# Patient Record
Sex: MALE | Race: WHITE | NOT HISPANIC OR LATINO | Employment: OTHER | URBAN - METROPOLITAN AREA
[De-identification: names, ages, dates, MRNs, and addresses within clinical notes are randomized per-mention and may not be internally consistent; named-entity substitution may affect disease eponyms.]

---

## 2018-12-11 PROBLEM — Z12.11 COLON CANCER SCREENING: Status: ACTIVE | Noted: 2018-12-11

## 2019-10-03 ENCOUNTER — TELEPHONE (OUTPATIENT)
Dept: OTOLARYNGOLOGY | Facility: CLINIC | Age: 79
End: 2019-10-03

## 2019-11-20 ENCOUNTER — OFFICE VISIT (OUTPATIENT)
Dept: OTOLARYNGOLOGY | Facility: CLINIC | Age: 79
End: 2019-11-20
Payer: MEDICARE

## 2019-11-20 ENCOUNTER — CLINICAL SUPPORT (OUTPATIENT)
Dept: AUDIOLOGY | Facility: CLINIC | Age: 79
End: 2019-11-20
Payer: MEDICARE

## 2019-11-20 VITALS — WEIGHT: 166.25 LBS | BODY MASS INDEX: 24.62 KG/M2 | HEIGHT: 69 IN

## 2019-11-20 DIAGNOSIS — H90.3 SENSORINEURAL HEARING LOSS, BILATERAL: Primary | ICD-10-CM

## 2019-11-20 DIAGNOSIS — H91.90 HEARING LOSS, UNSPECIFIED HEARING LOSS TYPE, UNSPECIFIED LATERALITY: ICD-10-CM

## 2019-11-20 DIAGNOSIS — H80.23 COCHLEAR OTOSCLEROSIS OF BOTH EARS: Primary | ICD-10-CM

## 2019-11-20 PROCEDURE — 99999 PR PBB SHADOW E&M-EST. PATIENT-LVL III: ICD-10-PCS | Mod: PBBFAC,,, | Performed by: OTOLARYNGOLOGY

## 2019-11-20 PROCEDURE — 1159F PR MEDICATION LIST DOCUMENTED IN MEDICAL RECORD: ICD-10-PCS | Mod: S$GLB,,, | Performed by: OTOLARYNGOLOGY

## 2019-11-20 PROCEDURE — 1159F MED LIST DOCD IN RCRD: CPT | Mod: S$GLB,,, | Performed by: OTOLARYNGOLOGY

## 2019-11-20 PROCEDURE — 99999 PR PBB SHADOW E&M-EST. PATIENT-LVL III: CPT | Mod: PBBFAC,,, | Performed by: OTOLARYNGOLOGY

## 2019-11-20 PROCEDURE — 92557 COMPREHENSIVE HEARING TEST: CPT | Mod: S$GLB,,, | Performed by: AUDIOLOGIST

## 2019-11-20 PROCEDURE — 1101F PR PT FALLS ASSESS DOC 0-1 FALLS W/OUT INJ PAST YR: ICD-10-PCS | Mod: CPTII,S$GLB,, | Performed by: OTOLARYNGOLOGY

## 2019-11-20 PROCEDURE — 1126F AMNT PAIN NOTED NONE PRSNT: CPT | Mod: S$GLB,,, | Performed by: OTOLARYNGOLOGY

## 2019-11-20 PROCEDURE — 1101F PT FALLS ASSESS-DOCD LE1/YR: CPT | Mod: CPTII,S$GLB,, | Performed by: OTOLARYNGOLOGY

## 2019-11-20 PROCEDURE — 92557 PR COMPREHENSIVE HEARING TEST: ICD-10-PCS | Mod: S$GLB,,, | Performed by: AUDIOLOGIST

## 2019-11-20 PROCEDURE — 1126F PR PAIN SEVERITY QUANTIFIED, NO PAIN PRESENT: ICD-10-PCS | Mod: S$GLB,,, | Performed by: OTOLARYNGOLOGY

## 2019-11-20 PROCEDURE — 99204 PR OFFICE/OUTPT VISIT, NEW, LEVL IV, 45-59 MIN: ICD-10-PCS | Mod: S$GLB,,, | Performed by: OTOLARYNGOLOGY

## 2019-11-20 PROCEDURE — 99204 OFFICE O/P NEW MOD 45 MIN: CPT | Mod: S$GLB,,, | Performed by: OTOLARYNGOLOGY

## 2019-11-20 NOTE — PROGRESS NOTES
Florin Braden was seen today for a hearing evaluation.     Pure tone audiometry revealed a severe-profound hearing loss, AU.   Unmasked bone conduction scores obtained from 45-50dB; could not mask due to severity of hearing loss, AU  SRT and PTA are in good agreement for the left ear. Attempted for the right ear however due to severity of hearing loss patient was unable to repeat words.   Could not test for speech discrimination bilaterally due to severity of hearing loss, AU  Tympanometry: Could not seal, AU    Recommendations:  1. Otologic Evaluation  2. Annual Audiogram or repeat audiogram as needed  3. Hearing Protection  4. Cochlear Implant consult and evaluation.

## 2019-11-20 NOTE — PROGRESS NOTES
"Subjective:       Patient ID: Florin Braden is a 79 y.o. male.    Chief Complaint: Hearing Loss (discuss possible implant)    HPI   79 year old male presents to clinic as a new patient to discuss cochlear implants.  He relates a long history of hearing loss.  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  He also reports progressive hearing loss on the left, which is his better hearing ear.  He had a PE tube placed on the left side about 5 years ago for recurrent ear infections.  He currently wears hearing aids, but feels they are "maxed out"    Review of Systems   Constitutional: Negative.  Negative for activity change, appetite change, chills, diaphoresis and fatigue.   HENT: Negative for congestion, dental problem, facial swelling, sinus pressure, sinus pain and voice change.    Eyes: Negative.  Negative for pain and discharge.   Respiratory: Negative.  Negative for apnea and stridor.    Cardiovascular: Negative.  Negative for chest pain.   Gastrointestinal: Negative.  Negative for nausea and vomiting.   Endocrine: Negative.    Genitourinary: Negative.    Musculoskeletal: Negative for arthralgias, gait problem and neck stiffness.   Skin: Negative for rash and wound.   Allergic/Immunologic: Negative.    Neurological: Negative.  Negative for seizures and syncope.   Hematological: Negative.  Negative for adenopathy. Does not bruise/bleed easily.   Psychiatric/Behavioral: Negative for behavioral problems and confusion.         Objective:      Physical Exam   Constitutional: He appears well-developed and well-nourished. No distress.   HENT:   Head: Normocephalic and atraumatic.   Right Ear: Tympanic membrane, external ear and ear canal normal. No drainage, swelling or tenderness. No foreign bodies. No middle ear effusion. Decreased hearing is noted.   Left Ear: External ear normal. No drainage, " swelling or tenderness. A foreign body (PE tube in place ) is present.  No middle ear effusion. Decreased hearing is noted.   Ears:    Tuning fork exam with BC>AC bilaterally (cannot hear air conduction).  BC lateralizes weakly to the left.   Skin: He is not diaphoretic.           Assessment:       1. Cochlear otosclerosis of both ears    2. Hearing loss, unspecified hearing loss type, unspecified laterality        Plan:       Mr. Braden has a profound bilateral mixed hearing loss.  Likely due to advanced otosclerosis.  The left ear is the better hearing ear.  Even in an aided state, he has very poor word discrimination in the right ear.  Will proceed with immunization and CT temporal bone to evaluate right cochlea for implantation.

## 2019-12-16 ENCOUNTER — OFFICE VISIT (OUTPATIENT)
Dept: OTOLARYNGOLOGY | Facility: CLINIC | Age: 79
End: 2019-12-16
Payer: MEDICARE

## 2019-12-16 ENCOUNTER — CLINICAL SUPPORT (OUTPATIENT)
Dept: INFECTIOUS DISEASES | Facility: CLINIC | Age: 79
End: 2019-12-16
Payer: MEDICARE

## 2019-12-16 ENCOUNTER — CLINICAL SUPPORT (OUTPATIENT)
Dept: AUDIOLOGY | Facility: CLINIC | Age: 79
End: 2019-12-16
Payer: MEDICARE

## 2019-12-16 ENCOUNTER — HOSPITAL ENCOUNTER (OUTPATIENT)
Dept: RADIOLOGY | Facility: HOSPITAL | Age: 79
Discharge: HOME | End: 2019-12-16
Attending: OTOLARYNGOLOGY
Payer: MEDICARE

## 2019-12-16 VITALS — WEIGHT: 166.25 LBS | BODY MASS INDEX: 24.55 KG/M2

## 2019-12-16 DIAGNOSIS — H90.3 SENSORINEURAL HEARING LOSS, BILATERAL: Primary | ICD-10-CM

## 2019-12-16 DIAGNOSIS — H91.13 PRESBYCUSIS OF BOTH EARS: Primary | ICD-10-CM

## 2019-12-16 DIAGNOSIS — H91.90 HEARING LOSS, UNSPECIFIED HEARING LOSS TYPE, UNSPECIFIED LATERALITY: ICD-10-CM

## 2019-12-16 PROCEDURE — 90670 PNEUMOCOCCAL CONJUGATE VACCINE 13-VALENT LESS THAN 5YO & GREATER THAN: ICD-10-PCS | Mod: S$GLB,,, | Performed by: INTERNAL MEDICINE

## 2019-12-16 PROCEDURE — 1159F MED LIST DOCD IN RCRD: CPT | Mod: S$GLB,,, | Performed by: OTOLARYNGOLOGY

## 2019-12-16 PROCEDURE — 99999 PR PBB SHADOW E&M-EST. PATIENT-LVL II: ICD-10-PCS | Mod: PBBFAC,,, | Performed by: OTOLARYNGOLOGY

## 2019-12-16 PROCEDURE — 70480 CT ORBIT/EAR/FOSSA W/O DYE: CPT | Mod: 26,,, | Performed by: RADIOLOGY

## 2019-12-16 PROCEDURE — 99999 PR PBB SHADOW E&M-EST. PATIENT-LVL II: CPT | Mod: PBBFAC,,, | Performed by: OTOLARYNGOLOGY

## 2019-12-16 PROCEDURE — 99213 OFFICE O/P EST LOW 20 MIN: CPT | Mod: 25,S$GLB,, | Performed by: OTOLARYNGOLOGY

## 2019-12-16 PROCEDURE — 1101F PT FALLS ASSESS-DOCD LE1/YR: CPT | Mod: CPTII,S$GLB,, | Performed by: OTOLARYNGOLOGY

## 2019-12-16 PROCEDURE — 70480 CT TEMPORAL BONE WITHOUT CONTRAST: ICD-10-PCS | Mod: 26,,, | Performed by: RADIOLOGY

## 2019-12-16 PROCEDURE — G0009 PNEUMOCOCCAL CONJUGATE VACCINE 13-VALENT LESS THAN 5YO & GREATER THAN: ICD-10-PCS | Mod: S$GLB,,, | Performed by: INTERNAL MEDICINE

## 2019-12-16 PROCEDURE — 99213 PR OFFICE/OUTPT VISIT, EST, LEVL III, 20-29 MIN: ICD-10-PCS | Mod: 25,S$GLB,, | Performed by: OTOLARYNGOLOGY

## 2019-12-16 PROCEDURE — 1126F AMNT PAIN NOTED NONE PRSNT: CPT | Mod: S$GLB,,, | Performed by: OTOLARYNGOLOGY

## 2019-12-16 PROCEDURE — 70480 CT ORBIT/EAR/FOSSA W/O DYE: CPT | Mod: TC

## 2019-12-16 PROCEDURE — 1101F PR PT FALLS ASSESS DOC 0-1 FALLS W/OUT INJ PAST YR: ICD-10-PCS | Mod: CPTII,S$GLB,, | Performed by: OTOLARYNGOLOGY

## 2019-12-16 PROCEDURE — G0009 ADMIN PNEUMOCOCCAL VACCINE: HCPCS | Mod: S$GLB,,, | Performed by: INTERNAL MEDICINE

## 2019-12-16 PROCEDURE — 1126F PR PAIN SEVERITY QUANTIFIED, NO PAIN PRESENT: ICD-10-PCS | Mod: S$GLB,,, | Performed by: OTOLARYNGOLOGY

## 2019-12-16 PROCEDURE — 92626 EVAL AUD FUNCJ 1ST HOUR: CPT | Mod: S$GLB,,, | Performed by: AUDIOLOGIST

## 2019-12-16 PROCEDURE — 90670 PCV13 VACCINE IM: CPT | Mod: S$GLB,,, | Performed by: INTERNAL MEDICINE

## 2019-12-16 PROCEDURE — 92626 PR EVAL, AUDITORY FUNCTION, SURG IMPLANT DEVICE, 1ST HR: ICD-10-PCS | Mod: S$GLB,,, | Performed by: AUDIOLOGIST

## 2019-12-16 PROCEDURE — 1159F PR MEDICATION LIST DOCUMENTED IN MEDICAL RECORD: ICD-10-PCS | Mod: S$GLB,,, | Performed by: OTOLARYNGOLOGY

## 2019-12-16 NOTE — PROGRESS NOTES
COCHLEAR IMPLANT EVALUATION     Florin Braden was seen for cochlear implant evaluation as recommended by Jose A Abdullahi MD. Mr. Braden reported a history of progressive sensorineural hearing loss bilaterally. He has worn hearing aids for the past 20+ years.  There is little benefit from traditional amplification despite consistent hearing aid use. Mr. Braden reported he recently was seen by his audiologist at [facility] due to a complaint of distortion from the right hearing aid, however the hearing aid was in good working order, instead Mr. Braden was advised to be seen at [facility] for a cochlear implant evaluation. Florin Braden has significant difficulty with everyday conversation.     Pure tone threshold testing revealed a  severe-profound sensorineural hearing loss, bilaterally.  Speech reception thresholds could not be obtained for the right ear due to the severity of hearing loss; were obtained at 90dB for the left ear.  Speech discrimination scores could not be obtained for either ear due to the severity of hearing loss.   Aided testing was completed in the best aided condition with his hearing aid at user settings.  Aided responses were obtained at 40-85dBHL in sound field with both ears aided.  AzBIO sentences were presented at 60dBSPL in sound field with both ears aided in quiet.  AzBIO BOTH AIDED=22%,  RIGHT EAR AIDED ONLY=0%,  LEFT EAR AIDED ONLY=31%. Mr. Braden scored 0% on CNC word list for the right ear aided and 36% for the left ear aided at 50dBSPL.      Based on the results of this comprehensive auditory evaluation, Mr. Braden was considered a good candidate for cochlear implantation in the right ear  from an audiological standpoint pending medical clearance. Florin Braden has severe to profound sensorineural hearing loss bilaterally with limited benefit from amplification.  Limited benefit from amplification is defined by the test scores of ? 40% correct in the best-aided listening condition on tape  recorded tests of open-set sentence cognition.  The cochlear implant is the only accepted medical procedure for the treatment of his sensorineural hearing loss. Mr. Braden meets all current standards for cochlear implantation according to the Medicare and private insurance guidelines.  The cochlear implant is not provided primarily for the convenience of the patient, physician, or healthcare provider, but rather to improve hearing and communicative functioning.  The is the most appropriate device that can be safely provided to the patient.  It will be furnished by qualified personnel at [facility].  The benefits and limitations of cochlear implantation were discussed with Florin Braden including the range of possible outcomes.  The rehabilitation requirements and the immunization recommendations were reviewed today.     Recommend:  1.  Obtain required immunizations   2.  Cochlear implant for the RIGHT ear - [employer]LEAR [employer]

## 2019-12-16 NOTE — PROGRESS NOTES
Subjective:       Patient ID: Florin Braden is a 79 y.o. male.    Chief Complaint: Follow-up (CI evaluation/discuss CT)    HPI: Here for re  Check.    CT gisselle nl.      Past Medical History: Patient has a past medical history of CAD (coronary artery disease), Colon polyp, Diabetes mellitus, Diabetic neuropathy, Diabetic retinopathy, Elevated cholesterol, Gastric polyp, Gastroparesis, Hypertension, and Internal hemorrhoids.    Past Surgical History: Patient has a past surgical history that includes Coronary angioplasty with stent; Cholecystectomy; Hernia repair (Bilateral); Polypectomy; Knee arthroscopy (Left); Eye surgery (Bilateral); Coronary angioplasty with stent (04/01/2006); Coronary angioplasty with stent (05/01/2006); and Colonoscopy (N/A, 12/11/2018).    Social History: Patient reports that he has never smoked. He has never used smokeless tobacco. He reports that he does not drink alcohol or use drugs.    Family History: family history includes Cancer in his father; Diabetes in his mother; Heart disease in his father; Hypertension in his mother; Stroke in his mother.    Medications:   Current Outpatient Medications   Medication Sig    aspirin (ECOTRIN) 81 MG EC tablet Take 81 mg by mouth once daily.    clopidogrel (PLAVIX) 75 mg tablet Take 75 mg by mouth twice a week.    co-enzyme Q-10 30 mg capsule Take 30 mg by mouth 3 (three) times daily.    folic acid/multivit-min/lutein (CENTRUM SILVER ORAL) Take 1 tablet by mouth once daily.    liraglutide 0.6 mg/0.1 mL, 18 mg/3 mL, subq PNIJ (VICTOZA 2-MARCELO) 0.6 mg/0.1 mL (18 mg/3 mL) PnIj Inject 18 mg into the skin once daily.    metFORMIN (GLUCOPHAGE) 500 MG tablet Take 500 mg by mouth 2 (two) times daily with meals.    metoprolol succinate (TOPROL-XL) 25 MG 24 hr tablet Take 25 mg by mouth once daily.    niacin (NIASPAN) 750 mg TbSR Take 750 mg by mouth once daily.    vit A/vit C/vit E/zinc/copper (PRESERVISION AREDS ORAL) Take 1 tablet by mouth once daily.     vit B complex no.12/niacin,B3, (VITAMIN B COMPLEX NO.12-NIACIN ORAL) Take 1 tablet by mouth once daily.     No current facility-administered medications for this visit.        Allergies: Patient is allergic to pcn [penicillins].    Review of Systems   Constitutional: Negative for activity change, appetite change, chills, diaphoresis, fatigue, fever and unexpected weight change.   HENT: Positive for hearing loss. Negative for congestion, ear discharge, ear pain, facial swelling, nosebleeds, postnasal drip, rhinorrhea, sinus pressure, sneezing, sore throat, tinnitus, trouble swallowing and voice change.    Eyes: Negative for photophobia, pain, discharge, redness and visual disturbance.   Respiratory: Negative for cough, chest tightness, shortness of breath and wheezing.    Cardiovascular: Negative for chest pain and palpitations.   Gastrointestinal: Negative for abdominal pain, constipation, diarrhea and nausea.   Genitourinary: Negative for dysuria and frequency.   Musculoskeletal: Negative for arthralgias, back pain, gait problem, joint swelling, myalgias, neck pain and neck stiffness.   Skin: Negative for color change, pallor and rash.   Neurological: Negative for dizziness, tremors, seizures, syncope, facial asymmetry, speech difficulty, weakness, light-headedness, numbness and headaches.   Hematological: Negative for adenopathy. Does not bruise/bleed easily.   Psychiatric/Behavioral: Negative for agitation, confusion, decreased concentration, dysphoric mood and sleep disturbance. The patient is not nervous/anxious and is not hyperactive.        Objective:      Physical Exam   Constitutional: He appears well-developed and well-nourished. No distress.   HENT:   Head: Normocephalic and atraumatic.   Right Ear: No lacerations. No drainage, swelling or tenderness. No foreign bodies. No mastoid tenderness. Tympanic membrane is not injected, not scarred, not perforated, not erythematous, not retracted and not  bulging. Tympanic membrane mobility is normal. No middle ear effusion. No hemotympanum. Decreased hearing is noted.   Left Ear: No lacerations. No drainage, swelling or tenderness. No foreign bodies. No mastoid tenderness. Tympanic membrane is not injected, not scarred, not perforated, not erythematous, not retracted and not bulging. Tympanic membrane mobility is normal.  No middle ear effusion. No hemotympanum. Decreased hearing is noted.   Mouth/Throat: Oropharynx is clear and moist. No oropharyngeal exudate.   Eyes: Pupils are equal, round, and reactive to light. Right eye exhibits normal extraocular motion and no nystagmus. Left eye exhibits normal extraocular motion and no nystagmus.   Neck: Normal range of motion.   Neurological: He is alert. He has normal strength. He displays no atrophy and no tremor. No cranial nerve deficit or sensory deficit. He exhibits normal muscle tone. He displays a negative Romberg sign. He displays no seizure activity. Coordination and gait normal.   Skin: He is not diaphoretic.               Assessment:       1. Presbycusis of both ears        Plan:         Patient to have CI evaluation and consideration for right CI. Appropriate imaging, medical and financial clearances to be done. Risks, complications, alternatives and goals discussed and reviewed. Including: infection, bleeding, failure of device, extrusion, dizziness, facial nerve problems and other potential issues.    There is no middle ear infection, the cochlear lumen is suited to implantation and there are no lesions of the auditory nerves or brain.    There are no contraindications to surgery.

## 2020-02-20 ENCOUNTER — CLINICAL SUPPORT (OUTPATIENT)
Dept: INFECTIOUS DISEASES | Facility: CLINIC | Age: 80
End: 2020-02-20
Payer: MEDICARE

## 2020-02-20 PROCEDURE — G0009 PNEUMOCOCCAL POLYSACCHARIDE VACCINE 23-VALENT =>2YO SQ IM: ICD-10-PCS | Mod: S$GLB,,, | Performed by: INTERNAL MEDICINE

## 2020-02-20 PROCEDURE — G0009 ADMIN PNEUMOCOCCAL VACCINE: HCPCS | Mod: S$GLB,,, | Performed by: INTERNAL MEDICINE

## 2020-02-20 PROCEDURE — 90732 PPSV23 VACC 2 YRS+ SUBQ/IM: CPT | Mod: S$GLB,,, | Performed by: INTERNAL MEDICINE

## 2020-02-20 PROCEDURE — 90732 PNEUMOCOCCAL POLYSACCHARIDE VACCINE 23-VALENT =>2YO SQ IM: ICD-10-PCS | Mod: S$GLB,,, | Performed by: INTERNAL MEDICINE

## 2020-03-03 ENCOUNTER — TELEPHONE (OUTPATIENT)
Dept: OTOLARYNGOLOGY | Facility: CLINIC | Age: 80
End: 2020-03-03

## 2020-03-03 NOTE — TELEPHONE ENCOUNTER
----- Message from Isatu Brown sent at 3/3/2020 10:11 AM CST -----  Contact: marii clarke [facility] in [place]  Called in regards to patient's surgery clearance.     She can be reached at [phone]    Thanks  KB

## 2020-03-03 NOTE — TELEPHONE ENCOUNTER
Spoke with Sofiya at Dr. Herron Kumar office  to fax over medical clearance for patient's surgery scheduled 3/24/2020. CGC

## 2020-05-21 ENCOUNTER — TELEPHONE (OUTPATIENT)
Dept: OTOLARYNGOLOGY | Facility: CLINIC | Age: 80
End: 2020-05-21

## 2020-05-21 DIAGNOSIS — Z01.818 PRE-OP TESTING: ICD-10-CM

## 2020-05-21 DIAGNOSIS — H91.13 PRESBYCUSIS OF BOTH EARS: Primary | ICD-10-CM

## 2020-07-06 ENCOUNTER — ANESTHESIA EVENT (OUTPATIENT)
Dept: SURGERY | Facility: HOSPITAL | Age: 80
End: 2020-07-06
Payer: MEDICARE

## 2020-07-06 ENCOUNTER — TELEPHONE (OUTPATIENT)
Dept: OTOLARYNGOLOGY | Facility: CLINIC | Age: 80
End: 2020-07-06

## 2020-07-06 NOTE — ANESTHESIA PREPROCEDURE EVALUATION
[facility]  Anesthesia Pre-Operative Evaluation         Patient Name: Florin Braden  YOB: 1940  MRN: [id number]    SUBJECTIVE:     Pre-operative evaluation for Procedure(s) (LRB):  INSERTION, PROSTHESIS, COCHLEAR (Right)     07/06/2020    Florin Braden is a 79 y.o. male w/ a significant PMHx of CAD s/p PCI [>10 years ago] on aspirin and plavix, HTN, HLD, DM and bilateral sensorineural hearing loss (right > left).  Patient now presents for the above procedure(s).    Prev airway: None documented.  Anes Hx: None documented. Tolerated sedation for colonoscopy    EKG 03/2020: Sinus rhythm    Patient Active Problem List   Diagnosis    Colon cancer screening       Review of patient's allergies indicates:   Allergen Reactions    Pcn [penicillins]        Current Inpatient Medications:      No current facility-administered medications on file prior to encounter.      Current Outpatient Medications on File Prior to Encounter   Medication Sig Dispense Refill    aspirin (ECOTRIN) 81 MG EC tablet Take 81 mg by mouth once daily.      clopidogrel (PLAVIX) 75 mg tablet Take 75 mg by mouth twice a week.      co-enzyme Q-10 30 mg capsule Take 30 mg by mouth 3 (three) times daily.      folic acid/multivit-min/lutein (CENTRUM SILVER ORAL) Take 1 tablet by mouth once daily.      liraglutide 0.6 mg/0.1 mL, 18 mg/3 mL, subq PNIJ (VICTOZA 2-MARCELO) 0.6 mg/0.1 mL (18 mg/3 mL) PnIj Inject 18 mg into the skin once daily.      metFORMIN (GLUCOPHAGE) 500 MG tablet Take 500 mg by mouth 2 (two) times daily with meals.      metoprolol succinate (TOPROL-XL) 25 MG 24 hr tablet Take 25 mg by mouth once daily.      niacin (NIASPAN) 750 mg TbSR Take 750 mg by mouth once daily.      vit A/vit C/vit E/zinc/copper (PRESERVISION AREDS ORAL) Take 1 tablet by mouth once daily.      vit B complex no.12/niacin,B3, (VITAMIN B COMPLEX NO.12-NIACIN ORAL) Take 1 tablet by mouth once daily.         Past Surgical History:    Procedure Laterality Date    CHOLECYSTECTOMY      COLONOSCOPY N/A 12/11/2018    Procedure: SCREENING COLONOSCOPY;  Surgeon: Axel Swartz MD;  Location: [facility];  Service: Endoscopy;  Laterality: N/A;    CORONARY ANGIOPLASTY WITH STENT PLACEMENT      CORONARY ANGIOPLASTY WITH STENT PLACEMENT  04/01/2006    CORONARY ANGIOPLASTY WITH STENT PLACEMENT  05/01/2006    EYE SURGERY Bilateral     RK, Cataract    HERNIA REPAIR Bilateral     inguinal    KNEE ARTHROSCOPY Left     POLYPECTOMY      gastric and colon       Social History     Socioeconomic History    Marital status:      Spouse name: Not on file    Number of children: Not on file    Years of education: Not on file    Highest education level: Not on file   Occupational History    Not on file   Social Needs    Financial resource strain: Not on file    Food insecurity     Worry: Not on file     Inability: Not on file    Transportation needs     Medical: Not on file     Non-medical: Not on file   Tobacco Use    Smoking status: Never Smoker    Smokeless tobacco: Never Used   Substance and Sexual Activity    Alcohol use: No     Frequency: Never    Drug use: No    Sexual activity: Not on file   Lifestyle    Physical activity     Days per week: Not on file     Minutes per session: Not on file    Stress: Not on file   Relationships    Social connections     Talks on phone: Not on file     Gets together: Not on file     Attends Sikhism service: Not on file     Active member of club or organization: Not on file     Attends meetings of clubs or organizations: Not on file     Relationship status: Not on file   Other Topics Concern    Not on file   Social History Narrative    Not on file       OBJECTIVE:     Vital Signs Range (Last 24H):         Significant Labs:  No results found for: WBC, HGB, HCT, PLT, CHOL, TRIG, HDL, LDLDIRECT, ALT, AST, NA, K, CL, CREATININE, BUN, CO2, TSH, PSA, INR, GLUF, HGBA1C, MICROALBUR    Diagnostic  Studies: No relevant studies.    EKG:   Results for orders placed or performed during the hospital encounter of 03/02/20   EKG 12-lead    Collection Time: 03/02/20  1:16 PM    Narrative    Test Reason : Z01.818,I25.9,    Vent. Rate : 062 BPM     Atrial Rate : 062 BPM     P-R Int : 184 ms          QRS Dur : 092 ms      QT Int : 368 ms       P-R-T Axes : 028 066 040 degrees     QTc Int : 373 ms    Sinus rhythm with marked sinus arrythmia  Otherwise normal ECG  No previous ECGs available  Confirmed by Nikolas Cook MD ([id number]) on 3/2/2020 7:07:29 PM    Referred By: EBONY PABON           Confirmed By:Nikolas Cook MD       2D ECHO:  TTE:  No results found for this or any previous visit.    ASSESSMENT/PLAN:       Anesthesia Evaluation    I have reviewed the Patient Summary Reports.    I have reviewed the Nursing Notes.    I have reviewed the Medications.     Review of Systems  Anesthesia Hx:  No problems with previous Anesthesia Denies Hx of Anesthetic complications  Denies Family Hx of Anesthesia complications.   Denies Personal Hx of Anesthesia complications.   Social:  Non-Smoker, No Alcohol Use    Cardiovascular:   Hypertension CAD   hyperlipidemia  Coronary Artery Disease:  Hypertension    Pulmonary:   Denies Asthma.  Denies Shortness of breath.    Renal/:   Denies Chronic Renal Disease.     Hepatic/GI:   Denies GERD.    Endocrine:   Diabetes  Diabetes        Physical Exam  General:  Well nourished    Airway/Jaw/Neck:  Airway Findings: Mouth Opening: Normal Tongue: Normal  General Airway Assessment: Adult  Mallampati: II      Dental:  Dental Findings: (missing bottom teeth)   Chest/Lungs:  Chest/Lungs Findings: Normal Respiratory Rate, Clear to auscultation     Heart/Vascular:  Heart Findings: Rate: Normal  Rhythm: Regular Rhythm  Sounds: Normal      Musculoskeletal:  Musculoskeletal Findings:          Anesthesia Plan  Type of Anesthesia, risks & benefits discussed:  Anesthesia Type:  general  Patient's  Preference:   Intra-op Monitoring Plan: standard ASA monitors  Intra-op Monitoring Plan Comments:   Post Op Pain Control Plan: multimodal analgesia, IV/PO Opioids PRN and per primary service following discharge from PACU  Post Op Pain Control Plan Comments:   Induction:   IV  Beta Blocker:  Patient is not currently on a Beta-Blocker (No further documentation required).       Informed Consent: Patient understands risks and agrees with Anesthesia plan.  Questions answered. Anesthesia consent signed with patient.  ASA Score: 2     Day of Surgery Review of History & Physical:    H&P update referred to the surgeon.     Anesthesia Plan Notes: Pt states last aspirin use 7 days ago, last plavix use 10 days ago. He does not take the metoprolol succinate listed in his medications.         Ready For Surgery From Anesthesia Perspective.

## 2020-07-07 ENCOUNTER — ANESTHESIA (OUTPATIENT)
Dept: SURGERY | Facility: HOSPITAL | Age: 80
End: 2020-07-07
Payer: MEDICARE

## 2020-07-07 ENCOUNTER — HOSPITAL ENCOUNTER (OUTPATIENT)
Facility: HOSPITAL | Age: 80
Discharge: HOME | End: 2020-07-07
Attending: OTOLARYNGOLOGY | Admitting: OTOLARYNGOLOGY
Payer: MEDICARE

## 2020-07-07 VITALS
HEIGHT: 68 IN | BODY MASS INDEX: 26.83 KG/M2 | WEIGHT: 177 LBS | OXYGEN SATURATION: 96 % | RESPIRATION RATE: 13 BRPM | DIASTOLIC BLOOD PRESSURE: 60 MMHG | TEMPERATURE: 98 F | HEART RATE: 79 BPM | SYSTOLIC BLOOD PRESSURE: 127 MMHG

## 2020-07-07 VITALS — HEART RATE: 88 BPM | DIASTOLIC BLOOD PRESSURE: 59 MMHG | OXYGEN SATURATION: 100 % | SYSTOLIC BLOOD PRESSURE: 125 MMHG

## 2020-07-07 DIAGNOSIS — H90.3 SENSORINEURAL HEARING LOSS (SNHL) OF BOTH EARS: ICD-10-CM

## 2020-07-07 DIAGNOSIS — H90.5 SENSORINEURAL HEARING LOSS: Primary | ICD-10-CM

## 2020-07-07 LAB
POCT GLUCOSE: 103 MG/DL (ref 70–110)
POCT GLUCOSE: 190 MG/DL (ref 70–110)
SARS-COV-2 RDRP RESP QL NAA+PROBE: NEGATIVE

## 2020-07-07 PROCEDURE — 82962 GLUCOSE BLOOD TEST: CPT | Performed by: OTOLARYNGOLOGY

## 2020-07-07 PROCEDURE — 94761 N-INVAS EAR/PLS OXIMETRY MLT: CPT

## 2020-07-07 PROCEDURE — U0002 COVID-19 LAB TEST NON-CDC: HCPCS

## 2020-07-07 PROCEDURE — 25000003 PHARM REV CODE 250: Performed by: STUDENT IN AN ORGANIZED HEALTH CARE EDUCATION/TRAINING PROGRAM

## 2020-07-07 PROCEDURE — 69930 IMPLANT COCHLEAR DEVICE: CPT | Mod: RT,,, | Performed by: OTOLARYNGOLOGY

## 2020-07-07 PROCEDURE — 36000711: Performed by: OTOLARYNGOLOGY

## 2020-07-07 PROCEDURE — 71000015 HC POSTOP RECOV 1ST HR: Performed by: OTOLARYNGOLOGY

## 2020-07-07 PROCEDURE — 37000009 HC ANESTHESIA EA ADD 15 MINS: Performed by: OTOLARYNGOLOGY

## 2020-07-07 PROCEDURE — 37000008 HC ANESTHESIA 1ST 15 MINUTES: Performed by: OTOLARYNGOLOGY

## 2020-07-07 PROCEDURE — 36000710: Performed by: OTOLARYNGOLOGY

## 2020-07-07 PROCEDURE — 69930 PR IMPLANT COCHLEAR DEVICE: ICD-10-PCS | Mod: RT,,, | Performed by: OTOLARYNGOLOGY

## 2020-07-07 PROCEDURE — 71000033 HC RECOVERY, INTIAL HOUR: Performed by: OTOLARYNGOLOGY

## 2020-07-07 PROCEDURE — D9220A PRA ANESTHESIA: Mod: ,,, | Performed by: ANESTHESIOLOGY

## 2020-07-07 PROCEDURE — 27201423 OPTIME MED/SURG SUP & DEVICES STERILE SUPPLY: Performed by: OTOLARYNGOLOGY

## 2020-07-07 PROCEDURE — D9220A PRA ANESTHESIA: ICD-10-PCS | Mod: ,,, | Performed by: ANESTHESIOLOGY

## 2020-07-07 PROCEDURE — 63600175 PHARM REV CODE 636 W HCPCS: Performed by: STUDENT IN AN ORGANIZED HEALTH CARE EDUCATION/TRAINING PROGRAM

## 2020-07-07 PROCEDURE — 25000003 PHARM REV CODE 250: Performed by: OTOLARYNGOLOGY

## 2020-07-07 PROCEDURE — 71000044 HC DOSC ROUTINE RECOVERY FIRST HOUR: Performed by: OTOLARYNGOLOGY

## 2020-07-07 PROCEDURE — L8614 COCHLEAR DEVICE: HCPCS | Performed by: OTOLARYNGOLOGY

## 2020-07-07 DEVICE — MODEL IMPLANT CI612: Type: IMPLANTABLE DEVICE | Site: EAR | Status: FUNCTIONAL

## 2020-07-07 RX ORDER — PROPOFOL 10 MG/ML
VIAL (ML) INTRAVENOUS
Status: DISCONTINUED | OUTPATIENT
Start: 2020-07-07 | End: 2020-07-07

## 2020-07-07 RX ORDER — SUCCINYLCHOLINE CHLORIDE 20 MG/ML
INJECTION INTRAMUSCULAR; INTRAVENOUS
Status: DISCONTINUED | OUTPATIENT
Start: 2020-07-07 | End: 2020-07-07

## 2020-07-07 RX ORDER — ONDANSETRON 4 MG/1
4 TABLET, ORALLY DISINTEGRATING ORAL EVERY 6 HOURS PRN
Qty: 12 TABLET | Refills: 0 | Status: SHIPPED | OUTPATIENT
Start: 2020-07-07

## 2020-07-07 RX ORDER — FENTANYL CITRATE 50 UG/ML
INJECTION, SOLUTION INTRAMUSCULAR; INTRAVENOUS
Status: DISCONTINUED | OUTPATIENT
Start: 2020-07-07 | End: 2020-07-07

## 2020-07-07 RX ORDER — LIDOCAINE HYDROCHLORIDE 40 MG/ML
SOLUTION TOPICAL
Status: DISCONTINUED | OUTPATIENT
Start: 2020-07-07 | End: 2020-07-07

## 2020-07-07 RX ORDER — DEXAMETHASONE SODIUM PHOSPHATE 4 MG/ML
INJECTION, SOLUTION INTRA-ARTICULAR; INTRALESIONAL; INTRAMUSCULAR; INTRAVENOUS; SOFT TISSUE
Status: DISCONTINUED | OUTPATIENT
Start: 2020-07-07 | End: 2020-07-07

## 2020-07-07 RX ORDER — HYDROCODONE BITARTRATE AND ACETAMINOPHEN 5; 325 MG/1; MG/1
1 TABLET ORAL EVERY 6 HOURS PRN
Qty: 12 TABLET | Refills: 0 | Status: SHIPPED | OUTPATIENT
Start: 2020-07-07

## 2020-07-07 RX ORDER — SODIUM CHLORIDE 0.9 % (FLUSH) 0.9 %
10 SYRINGE (ML) INJECTION
Status: DISCONTINUED | OUTPATIENT
Start: 2020-07-07 | End: 2020-07-14 | Stop reason: HOSPADM

## 2020-07-07 RX ORDER — ONDANSETRON 2 MG/ML
4 INJECTION INTRAMUSCULAR; INTRAVENOUS DAILY PRN
Status: DISCONTINUED | OUTPATIENT
Start: 2020-07-07 | End: 2020-07-14 | Stop reason: HOSPADM

## 2020-07-07 RX ORDER — LIDOCAINE HCL/PF 100 MG/5ML
SYRINGE (ML) INTRAVENOUS
Status: DISCONTINUED | OUTPATIENT
Start: 2020-07-07 | End: 2020-07-07

## 2020-07-07 RX ORDER — MECLIZINE HCL 12.5 MG 12.5 MG/1
12.5 TABLET ORAL 3 TIMES DAILY PRN
Qty: 12 TABLET | Refills: 0 | Status: SHIPPED | OUTPATIENT
Start: 2020-07-07

## 2020-07-07 RX ORDER — CLINDAMYCIN HYDROCHLORIDE 300 MG/1
300 CAPSULE ORAL 3 TIMES DAILY
Qty: 21 CAPSULE | Refills: 0 | Status: SHIPPED | OUTPATIENT
Start: 2020-07-07 | End: 2020-07-14

## 2020-07-07 RX ORDER — CLINDAMYCIN PHOSPHATE 900 MG/50ML
900 INJECTION, SOLUTION INTRAVENOUS
Status: COMPLETED | OUTPATIENT
Start: 2020-07-07 | End: 2020-07-07

## 2020-07-07 RX ORDER — LIDOCAINE HYDROCHLORIDE AND EPINEPHRINE 10; 10 MG/ML; UG/ML
INJECTION, SOLUTION INFILTRATION; PERINEURAL
Status: DISCONTINUED | OUTPATIENT
Start: 2020-07-07 | End: 2020-07-07 | Stop reason: HOSPADM

## 2020-07-07 RX ORDER — ACETAMINOPHEN 10 MG/ML
INJECTION, SOLUTION INTRAVENOUS
Status: DISCONTINUED | OUTPATIENT
Start: 2020-07-07 | End: 2020-07-07

## 2020-07-07 RX ORDER — ROCURONIUM BROMIDE 10 MG/ML
INJECTION, SOLUTION INTRAVENOUS
Status: DISCONTINUED | OUTPATIENT
Start: 2020-07-07 | End: 2020-07-07

## 2020-07-07 RX ORDER — FENTANYL CITRATE 50 UG/ML
25 INJECTION, SOLUTION INTRAMUSCULAR; INTRAVENOUS EVERY 5 MIN PRN
Status: DISCONTINUED | OUTPATIENT
Start: 2020-07-07 | End: 2020-07-14 | Stop reason: HOSPADM

## 2020-07-07 RX ORDER — EPHEDRINE SULFATE 50 MG/ML
INJECTION, SOLUTION INTRAVENOUS
Status: DISCONTINUED | OUTPATIENT
Start: 2020-07-07 | End: 2020-07-07

## 2020-07-07 RX ORDER — LIDOCAINE HYDROCHLORIDE 10 MG/ML
1 INJECTION, SOLUTION EPIDURAL; INFILTRATION; INTRACAUDAL; PERINEURAL ONCE
Status: DISCONTINUED | OUTPATIENT
Start: 2020-07-07 | End: 2020-07-14 | Stop reason: HOSPADM

## 2020-07-07 RX ORDER — MIDAZOLAM HYDROCHLORIDE 1 MG/ML
INJECTION, SOLUTION INTRAMUSCULAR; INTRAVENOUS
Status: DISCONTINUED | OUTPATIENT
Start: 2020-07-07 | End: 2020-07-07

## 2020-07-07 RX ORDER — PHENYLEPHRINE HYDROCHLORIDE 10 MG/ML
INJECTION INTRAVENOUS
Status: DISCONTINUED | OUTPATIENT
Start: 2020-07-07 | End: 2020-07-07

## 2020-07-07 RX ORDER — SODIUM CHLORIDE 9 MG/ML
INJECTION, SOLUTION INTRAVENOUS CONTINUOUS PRN
Status: DISCONTINUED | OUTPATIENT
Start: 2020-07-07 | End: 2020-07-07

## 2020-07-07 RX ORDER — METHYLENE BLUE 10 MG/ML
INJECTION INTRAVENOUS
Status: DISCONTINUED | OUTPATIENT
Start: 2020-07-07 | End: 2020-07-07 | Stop reason: HOSPADM

## 2020-07-07 RX ADMIN — DEXAMETHASONE SODIUM PHOSPHATE 8 MG: 4 INJECTION, SOLUTION INTRAMUSCULAR; INTRAVENOUS at 08:07

## 2020-07-07 RX ADMIN — SUGAMMADEX 200 MG: 100 INJECTION, SOLUTION INTRAVENOUS at 08:07

## 2020-07-07 RX ADMIN — LIDOCAINE HYDROCHLORIDE 4 ML: 40 SOLUTION TOPICAL at 07:07

## 2020-07-07 RX ADMIN — ACETAMINOPHEN 1000 MG: 10 INJECTION, SOLUTION INTRAVENOUS at 08:07

## 2020-07-07 RX ADMIN — LIDOCAINE HYDROCHLORIDE 40 MG: 20 INJECTION, SOLUTION INTRAVENOUS at 07:07

## 2020-07-07 RX ADMIN — MIDAZOLAM HYDROCHLORIDE 0.5 MG: 1 INJECTION, SOLUTION INTRAMUSCULAR; INTRAVENOUS at 07:07

## 2020-07-07 RX ADMIN — CLINDAMYCIN PHOSPHATE 900 MG: 18 INJECTION, SOLUTION INTRAVENOUS at 07:07

## 2020-07-07 RX ADMIN — SODIUM CHLORIDE, SODIUM GLUCONATE, SODIUM ACETATE, POTASSIUM CHLORIDE, MAGNESIUM CHLORIDE, SODIUM PHOSPHATE, DIBASIC, AND POTASSIUM PHOSPHATE: .53; .5; .37; .037; .03; .012; .00082 INJECTION, SOLUTION INTRAVENOUS at 08:07

## 2020-07-07 RX ADMIN — PROPOFOL 150 MG: 10 INJECTION, EMULSION INTRAVENOUS at 07:07

## 2020-07-07 RX ADMIN — SUCCINYLCHOLINE CHLORIDE 120 MG: 20 INJECTION, SOLUTION INTRAMUSCULAR; INTRAVENOUS at 07:07

## 2020-07-07 RX ADMIN — SODIUM CHLORIDE: 0.9 INJECTION, SOLUTION INTRAVENOUS at 06:07

## 2020-07-07 RX ADMIN — EPHEDRINE SULFATE 10 MG: 50 INJECTION INTRAVENOUS at 08:07

## 2020-07-07 RX ADMIN — FENTANYL CITRATE 50 MCG: 50 INJECTION, SOLUTION INTRAMUSCULAR; INTRAVENOUS at 07:07

## 2020-07-07 RX ADMIN — PHENYLEPHRINE HYDROCHLORIDE 100 MCG: 10 INJECTION INTRAVENOUS at 08:07

## 2020-07-07 RX ADMIN — ROCURONIUM BROMIDE 20 MG: 10 INJECTION, SOLUTION INTRAVENOUS at 08:07

## 2020-07-07 RX ADMIN — FENTANYL CITRATE 50 MCG: 50 INJECTION, SOLUTION INTRAMUSCULAR; INTRAVENOUS at 08:07

## 2020-07-07 RX ADMIN — ONDANSETRON 4 MG: 2 INJECTION, SOLUTION INTRAMUSCULAR; INTRAVENOUS at 10:07

## 2020-07-07 RX ADMIN — ROCURONIUM BROMIDE 10 MG: 10 INJECTION, SOLUTION INTRAVENOUS at 07:07

## 2020-07-07 NOTE — OP NOTE
Pre Op Dx:Sensorineural Hearing Loss/ Rock    Post Op Dx: Same.    Operative Procedure: Nucleus 24 Channel 612 Cochlear Implantation with use of Operating Microscope and Facial Nerve Monitor. Right    Surgeon: Jose A Abdullahi M.D.    Assist:Viki  7/7/20    Estimated Blood Loss: 5 cc    Anesthesia: GET.    Operative Procedure in Detail:    The patient was brought to the operating room and placed in the supine position. After general endotracheal anesthesia was induced the patient was prepped and draped in there usual fashion for post auricular cochlear  implant surgery. Using the implant guides the front corner of the implant bed was marked on the lateral musculoperiosteum with an injection of methylene blue.  A C shaped incision was outlined in the postauricular area and infiltrated with 1% xylocaine with 1/100,000 epinephrine solution. The incision was made and carried down to the level of the temporalis muscle.  Hemostasis was obtained. A posteriorly directed subcutaneous flap was created to the level of the methelene blue kay. Retractors were placed. An anterior based musculoperiosteal flap was incised and elevated to the level of the ear canal. Emissary veins were managed with bone wax and electrocautery. A posterior, superior subperiosteal flap was then elevated for the implant. Using the implant guides a trough for the implant electrodes were created in the lateral temporal bone at the site of the methylene blue kay to a depth of 5 mm. A posterior, superior ramp was then drilled for the implant antennae. A complete simple mastoidectomy was next done and the facial recess opened using a 1.5 mm osamni tommy. The round window niche was identified.  Musculoperiosteum was harvested for later cochlear packing. Final hemostasis and irrigation was then done. A trough was created underneath the temporalis muscle for the ground electrode. Using micro suction, a 1 mm tommy and soft surgical techniques a cochleostomy  was created into the basal turn of the cochlea at the anterior inferior aspect of the round window niche. The implant was then brought on to the field and placed into the subperiosteal pocket. The active electrode was introduced into the cochleostomy and advanced off stylet until a full insertion had been obtained. The previously harvested fibrous tissue was then used to pack the cochleostomy to affect a seal. The ground electrode was placed in the superior trough and under the temporalis muscle.  The electrodes were then coiled into the mastoid defect. Final hemostasis was obtained with bipolar electrocautery only. Irrigation was done and the wound was closed in layers. A sterile pressure dressing was applied. The patient tolerated the procedure well. There were no intraoperative complications.

## 2020-07-07 NOTE — DISCHARGE INSTRUCTIONS
Cochlear Implant Surgery    A cochlear implant is a device that helps reverse nerve-related hearing loss. It can treat hearing loss that will not respond to hearing aids. During cochlear implant surgery, the device is implanted into the inner ear (cochlea). A few weeks after surgery, the device is activated and hearing is restored. Typically, only 1 implant is placed. But, if needed, an implant can be placed in both ears. You and your healthcare provider will discuss whats best for you.  Preparing for surgery  Prepare for the procedure as you have been instructed. In addition:  · Be sure to tell your healthcare provider about all medicines you take. This includes over-the-counter medicines. It also includes herbs and other supplements. You may need to stop taking some or all of them before surgery as directed by your healthcare provider.  · Follow any directions you are given for not eating or drinking before surgery.  The day of surgery  The surgery takes 2 to 3 hours. Before the surgery begins:  · An IV line is put into a vein in your arm or hand. This line delivers fluids and medicines.  · You will be given medicine (anesthesia) to keep you free of pain during the surgery. You will have general anesthesia. This puts you into a state like deep sleep during the surgery.  · The area around the implant site will be shaved.  During the surgery  · The surgeon makes an incision behind the ear. The mastoid bone is exposed. This is the bone you can feel behind the ear.  · The mastoid bone is opened with a surgical drill. Great care is taken to avoid harming the facial nerve, which runs through the bone. This nerve controls your facial muscles. A facial nerve monitor (a machine with a small sensor that is put onto your cheek) may be used to map the nerves exact location. This helps avoid damage.  · The cochlear implant is placed inside the hole in the mastoid bone.  · A group of electrodes called the electrode array is  attached. This is placed into the inner ear.  · More bone is removed from behind the ear. The /stimulator is then placed in this hole. This allows the unit to lie flat under the skin.  · The skin incision behind the ear is closed with sutures.  · If an implant is being placed in the other ear, this may be done at this time.  After the surgery  You will be taken to a recovery room to wake up from the anesthesia. You may be sleepy and nauseated at first. And you may feel dizzy. You will be given medicine to manage any pain. You may then be taken to a hospital room to stay overnight. Once you are ready to go home, you will be released to an adult family member or friend. Have someone stay with you for the next couple of days to help care for you as your healing begins.  Recovering at home  Recovery time varies for each person. Your healthcare provider will tell you when you can return to your normal routine. Once at home, follow the instructions you have been given. While you recover:  · Take prescribed pain medicine exactly as directed. Take it on time. Do not wait for the pain to get bad before you take it.  · For 3 to 5 days after surgery, sleep with your head raised above the level of your heart. This helps reduce swelling.  · Do not drive until your healthcare provider says its OK.  · Care for incisions as instructed by your healthcare provider.  When to call your healthcare provider  Be sure you have a contact number for your healthcare provider. After you get home, call if you have any of the following:  · Chest pain or trouble breathing (call 911 or other emergency service)  · Fever of 100.4°F (38°C) or higher, or as directed by your healthcare provider  · Pain that does not get better with medicine  · Symptoms of infection at an incision site, like increased redness or swelling, warmth, worsening pain, or foul-smelling drainage  · Signs of meningitis, like increasing neck stiffness, sensitivity to  light, dizziness that gets worse, or facial weakness (note: meningitis could happen months after surgery)   Follow-up  During follow-up visits, your healthcare provider will check your healing. Stitches or staples will be removed 7 to 10 days after the surgery. When the incision has healed, the outer part of the implant is attached behind your ear. This will allow the device to work. Continue to follow up with your healthcare provider as directed. Speech and hearing specialists will help you adjust to your implant.  Risks and possible complications  Risks of cochlear implant surgery include:  · Bleeding  · Infection  · Temporary dizziness  · Severe vertigo (dizziness) that lasts up to 6 weeks  · Tinnitus (ringing or buzzing in your ears)  · Device eroding through the skin  · Facial nerve paralysis  · Damage to nerves and blood vessels at or near the incision site  · Leakage of brain fluid  · Device failure  · Risks of anesthesia    Date Last Reviewed: 6/11/2015  © 0377-0945 [employer]. [address] [place] [address]. All rights reserved. This information is not intended as a substitute for professional medical care. Always follow your healthcare professional's instructions.        Anesthesia: General Anesthesia     You are watched continuously during your procedure by your anesthesia provider.     Youre due to have surgery. During surgery, youll be given medicine called anesthesia or anesthetic. This will keep you comfortable and pain-free. Your anesthesia provider will use general anesthesia.  What is general anesthesia?  General anesthesia puts you into a state like deep sleep. It goes into the bloodstream (IV anesthetics), into the lungs (gas anesthetics), or both. You feel nothing during the procedure. You will not remember it. During the procedure, the anesthesia provider monitors you continuously. He or she checks your heart rate and rhythm, blood pressure, breathing, and blood  oxygen.  · IV anesthetics. IV anesthetics are given through an IV line in your arm. Theyre often given first. This is so you are asleep before a gas anesthetic is started. Some kinds of IV anesthetics relieve pain. Others relax you. Your doctor will decide which kind is best in your case.  · Gas anesthetics. Gas anesthetics are breathed into the lungs. They are often used to keep you asleep. They can be given through a facemask or a tube placed in your larynx or trachea (breathing tube).  ¨ If you have a facemask, your anesthesia provider will most likely place it over your nose and mouth while youre still awake. Youll breathe oxygen through the mask as your IV anesthetic is started. Gas anesthetic may be added through the mask.  ¨ If you have a tube in the larynx or trachea, it will be inserted into your throat after youre asleep.  Anesthesia tools and medicines  You will likely have:  · IV anesthetics. These are put into an IV line into your bloodstream.  · Gas anesthetics. You breathe these anesthetics into your lungs, where they pass into your bloodstream.  · Pulse oximeter. This is a small clip that is attached to the end of your finger. This measures your blood oxygen level.  · Electrocardiography leads (electrodes). These are small sticky pads that are placed on your chest. They record your heart rate and rhythm.  · Blood pressure cuff. This reads your blood pressure.  Risks and possible complications  General anesthesia has some risks. These include:  · Breathing problems  · Nausea and vomiting  · Sore throat or hoarseness (usually temporary)  · Allergic reaction to the anesthetic  · Irregular heartbeat (rare)  · Cardiac arrest (rare)   Anesthesia safety  · Follow all instructions you are given for how long not to eat or drink before your procedure.  · Be sure your doctor knows what medicines and drugs you take. This includes over-the-counter medicines, herbs, supplements, alcohol or other drugs. You  will be asked when those were last taken.  · Have an adult family member or friend drive you home after the procedure.  · For the first 24 hours after your surgery:  ¨ Do not drive or use heavy equipment.  ¨ Do not make important decisions or sign legal documents. If important decisions or signing legal documents is necessary during the first 24 hours after surgery, have a trusted family member or spouse act on your behalf.  ¨ Avoid alcohol.  ¨ Have a responsible adult stay with you. He or she can watch for problems and help keep you safe.  Date Last Reviewed: 12/1/2016  © 3528-1915 [employer]. [address] [place] [address], [place] [address]. All rights reserved. This information is not intended as a substitute for professional medical care. Always follow your healthcare professional's instructions.

## 2020-07-07 NOTE — ANESTHESIA PROCEDURE NOTES
Intubation  Performed by: Jose Alejandro Campbell MD  Authorized by: Ramakrishna Christensen MD     Intubation:     Induction:  Intravenous    Intubated:  Postinduction    Mask Ventilation:  Easy mask    Attempts:  1    Attempted By:  Resident anesthesiologist    Method of Intubation:  Direct    Blade:  Tosin 3    Laryngeal View Grade: Grade IIA - cords partially seen      Difficult Airway Encountered?: No      Complications:  None    Airway Device:  Oral endotracheal tube    Airway Device Size:  7.5    Style/Cuff Inflation:  Cuffed    Inflation Amount (mL):  6    Tube secured:  23    Secured at:  The lips    Placement Verified By:  Capnometry    Findings Post-Intubation:  BS equal bilateral  Notes:      LTA

## 2020-07-07 NOTE — ANESTHESIA POSTPROCEDURE EVALUATION
Anesthesia Post Evaluation    Patient: Florin Braden    Procedure(s) Performed: Procedure(s) (LRB):  INSERTION, PROSTHESIS, COCHLEAR (Right)    Final Anesthesia Type: general    Patient location during evaluation: PACU  Patient participation: Yes- Able to Participate  Level of consciousness: awake and alert and oriented  Post-procedure vital signs: reviewed and stable  Pain management: adequate  Airway patency: patent    PONV status at discharge: No PONV  Anesthetic complications: no      Cardiovascular status: hemodynamically stable  Respiratory status: unassisted, spontaneous ventilation and room air  Hydration status: euvolemic  Follow-up not needed.          Vitals Value Taken Time   /60 07/07/20 1130   Temp 36.8 °C (98.2 °F) 07/07/20 1130   Pulse 81 07/07/20 1139   Resp 11 07/07/20 1139   SpO2 97 % 07/07/20 1139   Vitals shown include unvalidated device data.      No case tracking events are documented in the log.      Pain/Milton Score: Milton Score: 10 (7/7/2020 11:00 AM)        
no

## 2020-07-07 NOTE — TRANSFER OF CARE
"Anesthesia Transfer of Care Note    Patient: Florin Braden    Procedure(s) Performed: Procedure(s) (LRB):  INSERTION, PROSTHESIS, COCHLEAR (Right)    Patient location: PACU    Anesthesia Type: general    Transport from OR: Transported from OR on 6-10 L/min O2 by face mask with adequate spontaneous ventilation    Post pain: adequate analgesia    Post assessment: no apparent anesthetic complications    Post vital signs: stable    Level of consciousness: awake    Nausea/Vomiting: no nausea/vomiting    Complications: none    Transfer of care protocol was followed      Last vitals:   Visit Vitals  /60 (BP Location: Right arm, Patient Position: Lying)   Pulse 84   Temp 36.8 °C (98.2 °F) (Temporal)   Resp 15   Ht 5' 8" (1.727 m)   Wt 80.3 kg (177 lb)   SpO2 100%   BMI 26.91 kg/m²     "

## 2020-07-07 NOTE — BRIEF OP NOTE
[facility]  Brief Operative Note    Surgery Date: 7/7/2020     Surgeon(s) and Role:     * Jose A Abdullahi MD - Primary     * Nova Drew MD - Resident - Assisting        Pre-op Diagnosis:  Presbycusis of both ears [H91.13]    Post-op Diagnosis:  Post-Op Diagnosis Codes:     * Presbycusis of both ears [H91.13]    Procedure(s) (LRB):  INSERTION, PROSTHESIS, COCHLEAR (Right)    Anesthesia: General    Description of the findings of the procedure(s): right cochlear implant     Estimated Blood Loss: <15 ml         Specimens:   Specimen (12h ago, onward)    None            Discharge Note    OUTCOME: Patient tolerated treatment/procedure well without complication and is now ready for discharge.    DISPOSITION: Home or Self Care    FINAL DIAGNOSIS:  Sensorineural hearing loss    FOLLOWUP: In clinic    DISCHARGE INSTRUCTIONS:    Discharge Procedure Orders   Diet Adult Regular     Notify your health care provider if you experience any of the following:  temperature >100.4     Lifting restrictions     Notify your health care provider if you experience any of the following:  persistent nausea and vomiting or diarrhea     Notify your health care provider if you experience any of the following:  severe uncontrolled pain     Notify your health care provider if you experience any of the following:  redness, tenderness, or signs of infection (pain, swelling, redness, odor or green/yellow discharge around incision site)     Notify your health care provider if you experience any of the following:  difficulty breathing or increased cough     Notify your health care provider if you experience any of the following:  severe persistent headache     Notify your health care provider if you experience any of the following:  worsening rash     Notify your health care provider if you experience any of the following:  persistent dizziness, light-headedness, or visual disturbances     Notify your health care  provider if you experience any of the following:  increased confusion or weakness     Leave dressing on - Keep it clean, dry, and intact until clinic visit     Activity as tolerated        Clinical Reference Documents Added to Patient Instructions       Document    ANESTHESIA: GENERAL ANESTHESIA (ENGLISH)    COCHLEAR IMPLANT SURGERY (ENGLISH)

## 2020-07-07 NOTE — H&P
"Patient ID: Florin Braden is a 79 y.o. male.     Chief Complaint: Follow-up (CI evaluation/discuss CT)    HPI:      79 year old male presents to clinic as a new patient to discuss cochlear implants.  He relates a long history of hearing loss.  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  He also reports progressive hearing loss on the left, which is his better hearing ear.  He had a PE tube placed on the left side about 5 years ago for recurrent ear infections.  He currently wears hearing aids, but feels they are "maxed out"  CT [facility].        Past Medical History: Patient has a past medical history of CAD (coronary artery disease), Colon polyp, Diabetes mellitus, Diabetic neuropathy, Diabetic retinopathy, Elevated cholesterol, Gastric polyp, Gastroparesis, Hypertension, and Internal hemorrhoids.     Past Surgical History: Patient has a past surgical history that includes Coronary angioplasty with stent; Cholecystectomy; Hernia repair (Bilateral); Polypectomy; Knee arthroscopy (Left); Eye surgery (Bilateral); Coronary angioplasty with stent (04/01/2006); Coronary angioplasty with stent (05/01/2006); and Colonoscopy (N/A, 12/11/2018).     Social History: Patient reports that he has never smoked. He has never used smokeless tobacco. He reports that he does not drink alcohol or use drugs.     Family History: family history includes Cancer in his father; Diabetes in his mother; Heart disease in his father; Hypertension in his mother; Stroke in his mother.     Medications:        Current Outpatient Medications   Medication Sig    aspirin (ECOTRIN) 81 MG EC tablet Take 81 mg by mouth once daily.    clopidogrel (PLAVIX) 75 mg tablet Take 75 mg by mouth twice a week.    co-enzyme Q-10 30 mg capsule Take 30 mg by mouth 3 (three) times daily.    folic acid/multivit-min/lutein (CENTRUM SILVER ORAL) " Take 1 tablet by mouth once daily.    liraglutide 0.6 mg/0.1 mL, 18 mg/3 mL, subq PNIJ (VICTOZA 2-MARCELO) 0.6 mg/0.1 mL (18 mg/3 mL) PnIj Inject 18 mg into the skin once daily.    metFORMIN (GLUCOPHAGE) 500 MG tablet Take 500 mg by mouth 2 (two) times daily with meals.    metoprolol succinate (TOPROL-XL) 25 MG 24 hr tablet Take 25 mg by mouth once daily.    niacin (NIASPAN) 750 mg TbSR Take 750 mg by mouth once daily.    vit A/vit C/vit E/zinc/copper (PRESERVISION AREDS ORAL) Take 1 tablet by mouth once daily.    vit B complex no.12/niacin,B3, (VITAMIN B COMPLEX NO.12-NIACIN ORAL) Take 1 tablet by mouth once daily.      No current facility-administered medications for this visit.         Allergies: Patient is allergic to pcn [penicillins].     Review of Systems   Constitutional: Negative for activity change, appetite change, chills, diaphoresis, fatigue, fever and unexpected weight change.   HENT: Positive for hearing loss. Negative for congestion, ear discharge, ear pain, facial swelling, nosebleeds, postnasal drip, rhinorrhea, sinus pressure, sneezing, sore throat, tinnitus, trouble swallowing and voice change.    Eyes: Negative for photophobia, pain, discharge, redness and visual disturbance.   Respiratory: Negative for cough, chest tightness, shortness of breath and wheezing.    Cardiovascular: Negative for chest pain and palpitations.   Gastrointestinal: Negative for abdominal pain, constipation, diarrhea and nausea.   Genitourinary: Negative for dysuria and frequency.   Musculoskeletal: Negative for arthralgias, back pain, gait problem, joint swelling, myalgias, neck pain and neck stiffness.   Skin: Negative for color change, pallor and rash.   Neurological: Negative for dizziness, tremors, seizures, syncope, facial asymmetry, speech difficulty, weakness, light-headedness, numbness and headaches.   Hematological: Negative for adenopathy. Does not bruise/bleed easily.   Psychiatric/Behavioral: Negative for  agitation, confusion, decreased concentration, dysphoric mood and sleep disturbance. The patient is not nervous/anxious and is not hyperactive.        Objective:      Physical Exam   Constitutional: He appears well-developed and well-nourished. No distress.   HENT:   Head: Normocephalic and atraumatic.   Right Ear: No lacerations. No drainage, swelling or tenderness. No foreign bodies. No mastoid tenderness. Tympanic membrane is not injected, not scarred, not perforated, not erythematous, not retracted and not bulging. Tympanic membrane mobility is normal. No middle ear effusion. No hemotympanum. Decreased hearing is noted.   Left Ear: No lacerations. No drainage, swelling or tenderness. No foreign bodies. No mastoid tenderness. Tympanic membrane is not injected, not scarred, not perforated, not erythematous, not retracted and not bulging. Tympanic membrane mobility is normal.  No middle ear effusion. No hemotympanum. Decreased hearing is noted.   Mouth/Throat: Oropharynx is clear and moist. No oropharyngeal exudate.   Eyes: Pupils are equal, round, and reactive to light. Right eye exhibits normal extraocular motion and no nystagmus. Left eye exhibits normal extraocular motion and no nystagmus.   Neck: Normal range of motion.   Neurological: He is alert. He has normal strength. He displays no atrophy and no tremor. No cranial nerve deficit or sensory deficit. He exhibits normal muscle tone. He displays a negative Romberg sign. He displays no seizure activity. Coordination and gait normal.   Skin: He is not diaphoretic.                  Assessment:       1. Presbycusis of both ears        Plan:         Patient to have CI evaluation and consideration for right CI. Appropriate imaging, medical and financial clearances to be done. Risks, complications, alternatives and goals discussed and reviewed. Including: infection, bleeding, failure of device, extrusion, dizziness, facial nerve problems and other potential  issues.     There is no middle ear infection, the cochlear lumen is suited to implantation and there are no lesions of the auditory nerves or brain.     There are no contraindications to surgery.

## 2020-07-08 ENCOUNTER — OFFICE VISIT (OUTPATIENT)
Dept: OTOLARYNGOLOGY | Facility: CLINIC | Age: 80
End: 2020-07-08
Payer: MEDICARE

## 2020-07-08 VITALS — HEIGHT: 68 IN | WEIGHT: 176.13 LBS | BODY MASS INDEX: 26.69 KG/M2

## 2020-07-08 DIAGNOSIS — Z09 FOLLOW-UP EXAMINATION AFTER EAR SURGERY: Primary | ICD-10-CM

## 2020-07-08 PROCEDURE — 99024 PR POST-OP FOLLOW-UP VISIT: ICD-10-PCS | Mod: S$GLB,,, | Performed by: OTOLARYNGOLOGY

## 2020-07-08 PROCEDURE — 99024 POSTOP FOLLOW-UP VISIT: CPT | Mod: S$GLB,,, | Performed by: OTOLARYNGOLOGY

## 2020-07-08 PROCEDURE — 99999 PR PBB SHADOW E&M-EST. PATIENT-LVL III: CPT | Mod: PBBFAC,,, | Performed by: OTOLARYNGOLOGY

## 2020-07-08 PROCEDURE — 99999 PR PBB SHADOW E&M-EST. PATIENT-LVL III: ICD-10-PCS | Mod: PBBFAC,,, | Performed by: OTOLARYNGOLOGY

## 2020-07-08 NOTE — PROGRESS NOTES
1 day S/P R CI.    Pt doing well post op.  No unusual pain or drainage. No significant vertigo or nausea.    Dressing removed. No evidence of hematoma or seroma. Steristrips intact.  Facial nerve function normal. Packing dry and intact. Routine re check in one week with appropriate water precautions.

## 2020-07-15 ENCOUNTER — OFFICE VISIT (OUTPATIENT)
Dept: OTOLARYNGOLOGY | Facility: CLINIC | Age: 80
End: 2020-07-15
Payer: MEDICARE

## 2020-07-15 VITALS — WEIGHT: 173.94 LBS | HEIGHT: 68 IN | BODY MASS INDEX: 26.36 KG/M2

## 2020-07-15 DIAGNOSIS — Z09 FOLLOW-UP EXAMINATION AFTER EAR SURGERY: Primary | ICD-10-CM

## 2020-07-15 PROCEDURE — 99999 PR PBB SHADOW E&M-EST. PATIENT-LVL III: ICD-10-PCS | Mod: PBBFAC,,, | Performed by: OTOLARYNGOLOGY

## 2020-07-15 PROCEDURE — 99024 POSTOP FOLLOW-UP VISIT: CPT | Mod: S$GLB,,, | Performed by: OTOLARYNGOLOGY

## 2020-07-15 PROCEDURE — 99024 PR POST-OP FOLLOW-UP VISIT: ICD-10-PCS | Mod: S$GLB,,, | Performed by: OTOLARYNGOLOGY

## 2020-07-15 PROCEDURE — 99999 PR PBB SHADOW E&M-EST. PATIENT-LVL III: CPT | Mod: PBBFAC,,, | Performed by: OTOLARYNGOLOGY

## 2020-07-15 RX ORDER — SULFAMETHOXAZOLE AND TRIMETHOPRIM 400; 80 MG/1; MG/1
1 TABLET ORAL 2 TIMES DAILY
Qty: 20 TABLET | Refills: 1 | Status: SHIPPED | OUTPATIENT
Start: 2020-07-15 | End: 2020-07-22 | Stop reason: SDUPTHER

## 2020-07-15 NOTE — PROGRESS NOTES
1 week s/p R CI    Pt doing well post op.  No unusual pain. Patient reported some bleeding. He has not taken his aspirin since Saturday or his Plavix since 10 prior to surgery.     Steri strips removed. Bloody drainage expressed from PA incision, no signs of infection.     Incision care with ointment BID, oral abx X 10 days, RTC in 1 week. Instructed to restart anticoags Friday.

## 2020-07-21 ENCOUNTER — NURSE TRIAGE (OUTPATIENT)
Dept: ADMINISTRATIVE | Facility: CLINIC | Age: 80
End: 2020-07-21

## 2020-07-21 NOTE — TELEPHONE ENCOUNTER
Post procedure follow up call attempted, no contact made. Unable to leave message.     Reason for Disposition   No answer.  First attempt to contact caller.  Follow-up call scheduled within 15 minutes.    Additional Information   Negative: Busy signal.  First attempt to contact caller.  Follow-up call scheduled within 15 minutes.    Protocols used: NO CONTACT OR DUPLICATE CONTACT CALL-A-[place]

## 2020-07-22 ENCOUNTER — OFFICE VISIT (OUTPATIENT)
Dept: OTOLARYNGOLOGY | Facility: CLINIC | Age: 80
End: 2020-07-22
Payer: MEDICARE

## 2020-07-22 VITALS — WEIGHT: 173.94 LBS | BODY MASS INDEX: 26.45 KG/M2

## 2020-07-22 DIAGNOSIS — Z09 FOLLOW-UP EXAMINATION AFTER EAR SURGERY: Primary | ICD-10-CM

## 2020-07-22 PROCEDURE — 99024 POSTOP FOLLOW-UP VISIT: CPT | Mod: S$GLB,,, | Performed by: OTOLARYNGOLOGY

## 2020-07-22 PROCEDURE — 99999 PR PBB SHADOW E&M-EST. PATIENT-LVL III: ICD-10-PCS | Mod: PBBFAC,,, | Performed by: OTOLARYNGOLOGY

## 2020-07-22 PROCEDURE — 99999 PR PBB SHADOW E&M-EST. PATIENT-LVL III: CPT | Mod: PBBFAC,,, | Performed by: OTOLARYNGOLOGY

## 2020-07-22 PROCEDURE — 99024 PR POST-OP FOLLOW-UP VISIT: ICD-10-PCS | Mod: S$GLB,,, | Performed by: OTOLARYNGOLOGY

## 2020-07-22 RX ORDER — SULFAMETHOXAZOLE AND TRIMETHOPRIM 400; 80 MG/1; MG/1
1 TABLET ORAL 2 TIMES DAILY
Qty: 20 TABLET | Refills: 1 | Status: SHIPPED | OUTPATIENT
Start: 2020-07-22 | End: 2020-08-01

## 2020-07-22 NOTE — PROGRESS NOTES
3 wks S/P R CI.    Had some PA collection.'    Exam: small area of DC inf expressed/ serous.Redressed with neosporin.    P:Florin was seen today for follow-up.    Diagnoses and all orders for this visit:    Follow-up examination after ear surgery    Other orders  -     sulfamethoxazole-trimethoprim 400-80mg (BACTRIM,SEPTRA) 400-80 mg per tablet; Take 1 tablet by mouth 2 (two) times daily. for 10 days    Local care.    RTC 1 wk.

## 2020-07-29 ENCOUNTER — OFFICE VISIT (OUTPATIENT)
Dept: OTOLARYNGOLOGY | Facility: CLINIC | Age: 80
End: 2020-07-29
Payer: MEDICARE

## 2020-07-29 VITALS — WEIGHT: 173.94 LBS | BODY MASS INDEX: 26.45 KG/M2

## 2020-07-29 DIAGNOSIS — Z09 FOLLOW-UP EXAMINATION AFTER EAR SURGERY: Primary | ICD-10-CM

## 2020-07-29 PROCEDURE — 99999 PR PBB SHADOW E&M-EST. PATIENT-LVL III: CPT | Mod: PBBFAC,,, | Performed by: OTOLARYNGOLOGY

## 2020-07-29 PROCEDURE — 99024 PR POST-OP FOLLOW-UP VISIT: ICD-10-PCS | Mod: S$GLB,,, | Performed by: OTOLARYNGOLOGY

## 2020-07-29 PROCEDURE — 99024 POSTOP FOLLOW-UP VISIT: CPT | Mod: S$GLB,,, | Performed by: OTOLARYNGOLOGY

## 2020-07-29 PROCEDURE — 99999 PR PBB SHADOW E&M-EST. PATIENT-LVL III: ICD-10-PCS | Mod: PBBFAC,,, | Performed by: OTOLARYNGOLOGY

## 2020-07-29 NOTE — PROGRESS NOTES
Here for F/U R PA finally healing well.    No further DC.    Exam: R PA with no fluid/has dry eschar.    P: Con't local care RTC 1 wk for audio appt.

## 2020-08-06 ENCOUNTER — CLINICAL SUPPORT (OUTPATIENT)
Dept: AUDIOLOGY | Facility: CLINIC | Age: 80
End: 2020-08-06
Payer: MEDICARE

## 2020-08-06 DIAGNOSIS — H90.3 SENSORINEURAL HEARING LOSS, BILATERAL: Primary | ICD-10-CM

## 2020-08-06 PROCEDURE — 92603 PR DX ANAL COCHLEAR IMP,PT >7 YRS,W/PROG: ICD-10-PCS | Mod: S$GLB,,, | Performed by: AUDIOLOGIST

## 2020-08-06 PROCEDURE — 92603 COCHLEAR IMPLT F/UP EXAM 7/>: CPT | Mod: S$GLB,,, | Performed by: AUDIOLOGIST

## 2020-08-06 NOTE — PROGRESS NOTES
Cochlear Implant Initial Stimulation - RIGHT ear - Cochlear Open-Xchange     Right Ear:    - Cochlear  Internal Device/Serial Number - [id number] - [id number]  Processor - N7   [id number] and [id number]  DOS: 7/7/2020  DIS: 8/6/2020  Processor Color -Brown  Magnet Strength N7 = 3i    Coil Length - 6 cm  Battery Type - Standard and Rechargeable   Warranty - August 3, 2025    Left Ear - ILEANA EDWARDS     Mr. Braden was seen today for a cochlear implant initial stimulation. He was accompanied by his wife.   Impedances were checked and revealed electrode #15 to be short. This electrode was turned off.     Threshold levels (t's) and comfort levels (c's) were measured today. NRT measurements will be attempted in the future.   4 Progressive maps were also made, and Mr. Braden and his wife were counseled on when and how to change programs and volume.   The following was also reviewed in detail today: 1. Checking magnet site - Mr. Braden and his wife were counseled to notify me immediately if he feels the magnet site is tight and/or painful; 2. Manipulating batteries; 3. Using the Dry and store; 4. Making an account with [employer]; 5. Warranty information    Recommendations:  1. Follow-up on 9/9 at 10am with me or sooner if needed

## 2020-09-09 ENCOUNTER — CLINICAL SUPPORT (OUTPATIENT)
Dept: AUDIOLOGY | Facility: CLINIC | Age: 80
End: 2020-09-09
Payer: MEDICARE

## 2020-09-09 DIAGNOSIS — H90.3 SENSORINEURAL HEARING LOSS, BILATERAL: Primary | ICD-10-CM

## 2020-09-09 PROCEDURE — 92604 REPROGRAM COCHLEAR IMPLT 7/>: CPT | Mod: S$GLB,,, | Performed by: AUDIOLOGIST

## 2020-09-09 PROCEDURE — 92604 PR DX ANAL COCHLEAR IMP,PT >7 YRS,REPROG: ICD-10-PCS | Mod: S$GLB,,, | Performed by: AUDIOLOGIST

## 2020-09-09 NOTE — PROGRESS NOTES
Cochlear Implant Initial Stimulation - RIGHT ear - [employer]     Right Ear:    - Cochlear  Internal Device/Serial Number - CI 612 - [id number]  Processor - N7   [id number] and [id number]  DOS: 7/7/2020  DIS: 8/6/2020  Processor Color -Brown  Magnet Strength N7 = 2i    Coil Length - 6 cm  Battery Type - Standard and Rechargeable   Warranty - August 3, 2025    Left Ear - ILEANA EDWARDS     Mr. Braden was seen today for a cochlear implant 1 month post stimulation. He was accompanied by his wife.   Impedances were checked and electrode #15 is turned off    Threshold levels (t's) and comfort levels (c's) were measured again today. NRT measurements were attempted but could not be completed today.   3 Progressive maps were also made, and Mr. Braden and his wife were counseled on when and how to change programs and volume.     The following was also reviewed in detail today: 1. Checking magnet site - Mr. Braden and his wife were counseled to notify me immediately if he feels the magnet site is tight and/or painful; 2. Counseling regarding speech understanding - Mr. Braden seemed disappointed that he isn't able to understand his wife, they were both counseled again on expected outcomes and how it's going to take some time to understand speech. Mr. Braden did state he is hearing other sounds (I.e. mouse clicking, keyboard strokes etc...)     Recommendations:  1. Follow-up on December 4th or sooner if needed

## 2020-10-27 ENCOUNTER — OFFICE VISIT (OUTPATIENT)
Dept: OTOLARYNGOLOGY | Facility: CLINIC | Age: 80
End: 2020-10-27
Payer: MEDICARE

## 2020-10-27 DIAGNOSIS — H91.13 PRESBYCUSIS OF BOTH EARS: Primary | ICD-10-CM

## 2020-10-27 DIAGNOSIS — Z09 FOLLOW-UP EXAMINATION AFTER EAR SURGERY: ICD-10-CM

## 2020-10-27 PROCEDURE — 99213 OFFICE O/P EST LOW 20 MIN: CPT | Mod: S$GLB,,, | Performed by: OTOLARYNGOLOGY

## 2020-10-27 PROCEDURE — 99213 PR OFFICE/OUTPT VISIT, EST, LEVL III, 20-29 MIN: ICD-10-PCS | Mod: S$GLB,,, | Performed by: OTOLARYNGOLOGY

## 2020-10-27 PROCEDURE — 1101F PR PT FALLS ASSESS DOC 0-1 FALLS W/OUT INJ PAST YR: ICD-10-PCS | Mod: CPTII,S$GLB,, | Performed by: OTOLARYNGOLOGY

## 2020-10-27 PROCEDURE — 1159F MED LIST DOCD IN RCRD: CPT | Mod: S$GLB,,, | Performed by: OTOLARYNGOLOGY

## 2020-10-27 PROCEDURE — 99999 PR PBB SHADOW E&M-EST. PATIENT-LVL II: CPT | Mod: PBBFAC,,, | Performed by: OTOLARYNGOLOGY

## 2020-10-27 PROCEDURE — 99999 PR PBB SHADOW E&M-EST. PATIENT-LVL II: ICD-10-PCS | Mod: PBBFAC,,, | Performed by: OTOLARYNGOLOGY

## 2020-10-27 PROCEDURE — 1159F PR MEDICATION LIST DOCUMENTED IN MEDICAL RECORD: ICD-10-PCS | Mod: S$GLB,,, | Performed by: OTOLARYNGOLOGY

## 2020-10-27 PROCEDURE — 1101F PT FALLS ASSESS-DOCD LE1/YR: CPT | Mod: CPTII,S$GLB,, | Performed by: OTOLARYNGOLOGY

## 2020-10-27 NOTE — PROGRESS NOTES
Otology/Neurotology History and Physical     CC: hearing loss    HPI:  Florin Braden is a 80 y.o. male who is approximately 3 months out from right CI presents complaining of difficulty hearing. He cleaned out some black material from his right ear canal with a q tip about 1 month ago, and since then feels that his implant isn't working as well. He denies f/c/n/v, vertigo, otorrhea, otalgia.      Past Medical History  He has a past medical history of CAD (coronary artery disease), Colon polyp, Diabetes mellitus, Diabetic neuropathy, Diabetic retinopathy, Elevated cholesterol, Gastric polyp, Gastroparesis, Hypertension, and Internal hemorrhoids.    Past Surgical History  He has a past surgical history that includes Coronary angioplasty with stent; Cholecystectomy; Hernia repair (Bilateral); Polypectomy; Knee arthroscopy (Left); Eye surgery (Bilateral); Coronary angioplasty with stent (04/01/2006); Coronary angioplasty with stent (05/01/2006); Colonoscopy (N/A, 12/11/2018); and Implantation of cochlear prosthesis (Right, 7/7/2020).    Family History  His family history includes Cancer in his father; Diabetes in his mother; Heart disease in his father; Hypertension in his mother; Stroke in his mother.    Social History  He reports that he has never smoked. He has never used smokeless tobacco. He reports that he does not drink alcohol or use drugs.    Allergies  He is allergic to pcn [penicillins].    Medications  He has a current medication list which includes the following prescription(s): aspirin, co-enzyme q-10, folic acid/multivit-min/lutein, hydrocodone-acetaminophen, liraglutide 0.6 mg/0.1 ml (18 mg/3 ml) subq pnij, meclizine, metformin, metoprolol succinate, niacin, ondansetron, vit a/vit c/vit e/zinc/copper, and vit b complex no.12/niacin(b3).    Review of Systems       Objective:     There were no vitals taken for this visit.   Physical Exam  Constitutional: Well appearing/communicating. Voice clear.  NAD.  Eyes: EOM I Bilaterally  Head/Face: Normocephalic.  House Brackmann I Bilaterally.  Right Ear: External Auditory Canal WNL,TM w/o masses/lesions/perforations.  Auricle WNL. PA well healed.  Left Ear: External Auditory Canal WNL,TM w/o masses/lesions/perforations. Auricle WNL. PET in place.  Nose: No gross nasal septal deviation. Inferior Turbinates 2+ bilaterally. No septal perforation. No masses/lesions. External nasal skin without masses/lesions.  Oral Cavity: Gingiva/lips WNL. Oral Tongue mobile. Hard Palate WNL.   Oropharynx: Tonsillar fossa/pharyngeal wall without lesions. Posterior oropharynx WNL.  Soft palate without masses. Midline uvula.   Neck/Lymphatic: No LAD I-VI bilaterally.  No thyromegaly.  No masses noted on exam.  Neuro/Psychiatric: AOx3.  Normal mood and affect.   Respiratory: Normal respiratory effort, no stridor/stertor, no retractions noted.      Procedure    None        Data Reviewed           Assessment:     1. Presbycusis of both ears    2. Follow-up examination after ear surgery         Plan:   F/u with audiology re implant

## 2020-12-04 ENCOUNTER — CLINICAL SUPPORT (OUTPATIENT)
Dept: AUDIOLOGY | Facility: CLINIC | Age: 80
End: 2020-12-04
Payer: MEDICARE

## 2020-12-04 DIAGNOSIS — H90.3 SENSORINEURAL HEARING LOSS, BILATERAL: Primary | ICD-10-CM

## 2020-12-04 PROCEDURE — 92604 REPROGRAM COCHLEAR IMPLT 7/>: CPT | Mod: S$GLB,,, | Performed by: AUDIOLOGIST

## 2020-12-04 PROCEDURE — 92604 PR DX ANAL COCHLEAR IMP,PT >7 YRS,REPROG: ICD-10-PCS | Mod: S$GLB,,, | Performed by: AUDIOLOGIST

## 2020-12-04 NOTE — PROGRESS NOTES
Cochlear Implant 3 month post stim - RIGHT ear - [employer]     Right Ear:    - Cochlear  Internal Device/Serial Number - CI 612 - [id number]  Processor - N7   [id number] and [id number]  DOS: 7/7/2020  DIS: 8/6/2020  Processor Color -Brown  Magnet Strength N7 = 2i    Coil Length - 6 cm  Battery Type - Standard and Rechargeable   Warranty - August 3, 2025    Left Ear - ILEANA EDWARDS     Mr. Braden was seen today for a cochlear implant 3 month post stimulation. He was accompanied by his wife.   Impedances were checked and electrode #15 is turned off. Mr. Braden wears his processor on average 14 hours/day.     - NRTs were measured and completed today.  - Sweeping c's was also completed today, c's in the mids and high frequencies were decreased slightly.   - t's were also verified.   - Mr. Braden was given 2 maps based off the changes made today. The second map is a slight increase in c's.   - An audiogram was also completed (see below). Speech discrimination could not be completed but will be attempted at Mr. Braden's next visit.     In addition the TV streamer and MM2 were also paired to Mr. Braden's processor and he was counseled on when and how to use each. Lastly, a replacement coil will be sent to Mr. Braden as his current one seems to have a short.     The following was also reviewed in detail today: 1. Checking magnet site - Mr. Braden and his wife were counseled to notify me immediately if he feels the magnet site is tight and/or painful; 2. Counseling regarding speech understanding    P1 - Map 11  P2 - Map 12 - slight increase in c's     Recommendations:  1. Follow-up in May 2021 or sooner if needed

## 2021-05-05 ENCOUNTER — CLINICAL SUPPORT (OUTPATIENT)
Dept: AUDIOLOGY | Facility: CLINIC | Age: 81
End: 2021-05-05
Payer: MEDICARE

## 2021-05-05 DIAGNOSIS — H90.3 SENSORINEURAL HEARING LOSS, BILATERAL: Primary | ICD-10-CM

## 2021-05-05 PROCEDURE — 92604 PR DX ANAL COCHLEAR IMP,PT >7 YRS,REPROG: ICD-10-PCS | Mod: S$GLB,,, | Performed by: AUDIOLOGIST

## 2021-05-05 PROCEDURE — 92604 REPROGRAM COCHLEAR IMPLT 7/>: CPT | Mod: S$GLB,,, | Performed by: AUDIOLOGIST

## 2021-08-05 ENCOUNTER — TELEPHONE (OUTPATIENT)
Dept: AUDIOLOGY | Facility: CLINIC | Age: 81
End: 2021-08-05

## 2021-08-05 DIAGNOSIS — H90.3 SENSORINEURAL HEARING LOSS, BILATERAL: Primary | ICD-10-CM

## 2021-08-18 ENCOUNTER — CLINICAL SUPPORT (OUTPATIENT)
Dept: AUDIOLOGY | Facility: CLINIC | Age: 81
End: 2021-08-18
Payer: MEDICARE

## 2021-08-18 DIAGNOSIS — H90.3 SENSORINEURAL HEARING LOSS, BILATERAL: ICD-10-CM

## 2021-08-18 PROCEDURE — 99999 PR PBB SHADOW E&M-EST. PATIENT-LVL I: ICD-10-PCS | Mod: PBBFAC,,, | Performed by: AUDIOLOGIST

## 2021-08-18 PROCEDURE — 92604 REPROGRAM COCHLEAR IMPLT 7/>: CPT | Mod: S$GLB,,, | Performed by: AUDIOLOGIST

## 2021-08-18 PROCEDURE — 99999 PR PBB SHADOW E&M-EST. PATIENT-LVL I: CPT | Mod: PBBFAC,,, | Performed by: AUDIOLOGIST

## 2021-08-18 PROCEDURE — 92604 PR DX ANAL COCHLEAR IMP,PT >7 YRS,REPROG: ICD-10-PCS | Mod: S$GLB,,, | Performed by: AUDIOLOGIST

## 2022-02-24 ENCOUNTER — TELEPHONE (OUTPATIENT)
Dept: AUDIOLOGY | Facility: CLINIC | Age: 82
End: 2022-02-24
Payer: MEDICARE

## 2022-02-24 DIAGNOSIS — H90.3 SENSORINEURAL HEARING LOSS, BILATERAL: Primary | ICD-10-CM

## 2022-03-22 ENCOUNTER — CLINICAL SUPPORT (OUTPATIENT)
Dept: AUDIOLOGY | Facility: CLINIC | Age: 82
End: 2022-03-22
Payer: MEDICARE

## 2022-03-22 DIAGNOSIS — H90.3 SENSORINEURAL HEARING LOSS, BILATERAL: ICD-10-CM

## 2022-03-22 PROCEDURE — 92604 REPROGRAM COCHLEAR IMPLT 7/>: CPT | Mod: S$GLB,,, | Performed by: AUDIOLOGIST

## 2022-03-22 PROCEDURE — 92604 PR DX ANAL COCHLEAR IMP,PT >7 YRS,REPROG: ICD-10-PCS | Mod: S$GLB,,, | Performed by: AUDIOLOGIST

## 2022-03-22 PROCEDURE — 99999 PR PBB SHADOW E&M-EST. PATIENT-LVL I: ICD-10-PCS | Mod: PBBFAC,,, | Performed by: AUDIOLOGIST

## 2022-03-22 PROCEDURE — 99999 PR PBB SHADOW E&M-EST. PATIENT-LVL I: CPT | Mod: PBBFAC,,, | Performed by: AUDIOLOGIST

## 2022-03-22 NOTE — PROGRESS NOTES
3/22/2022    Cochlear Implant Programming Session:    Right Ear: Dr. Abdullahi  : [employer]  Internal Device/Serial Number: CI 612 - [id number]  Processor: N7  [id number] and [id number]  DOS: 7/7/2020  DIS: 8/6/2020  Processor Color:  Brown  Magnet Strength N7:   2i    Coil Length:  6 cm  Battery Type:  Standard and Rechargeable   Warranty:   August 3, 2025     Left Ear - ILEANA EDWARDS  Alisha Braden was seen for a follow up programming session with his cochlear implant sound processor.  Mr. Braden stated the sound from the processor was very low as compared to the sound from the hearing aid.  He felt the hearing aid was overpowering the cochlear implant sound processor and causing distortion.  Mr. Braden stated he was unable to adjust the volume in the hearing aid but he was planning to return for follow up programming after the implant was adjusted.  Mr. Braden's equipment was evaluated.  He was using both sound processors with 2I magnets.      Aided testing was completed for each ear.  Aided responses were obtained at 25-30dBHL for the right ear with the cochlear implant sound processor only and 40-60dBHL for the left ear only with his hearing aid.  Aided speech reception thresholds were obtained at 30dBHL for the right ear and 60dBHL for the left ear.  Speech discrimination scores were obtained at 48% for the cochlear implant only at 50dBHL, 52% for the hearing aid only at 80dBHL, and 60% with both ears aided at 50dBHL.      Impedance testing was completed.  The sound processor was set with a new map based on T and C levels and adjusted for comfort.  Four progressive maps were created.  Mr. Braden was instructed on the procedure to adjust the map with the remote control.  He will return to his audiologist for follow up programming of the hearing aid.    The procedure to obtain supplies from [facility] was reviewed today. The reimbursement documents were submitted with his insurance  information.    Recommend:  1.  Follow up programming in 6 months or sooner if needed.  2.  Hearing aid programming as needed.

## 2022-09-07 ENCOUNTER — CLINICAL SUPPORT (OUTPATIENT)
Dept: AUDIOLOGY | Facility: CLINIC | Age: 82
End: 2022-09-07
Payer: MEDICARE

## 2022-09-07 DIAGNOSIS — H90.3 SENSORINEURAL HEARING LOSS, BILATERAL: Primary | ICD-10-CM

## 2022-09-07 DIAGNOSIS — H93.293 IMPAIRMENT OF AUDITORY DISCRIMINATION OF BOTH EARS: ICD-10-CM

## 2022-09-07 PROCEDURE — 92604 REPROGRAM COCHLEAR IMPLT 7/>: CPT | Mod: S$GLB,,, | Performed by: AUDIOLOGIST

## 2022-09-07 PROCEDURE — 92604 PR DX ANAL COCHLEAR IMP,PT >7 YRS,REPROG: ICD-10-PCS | Mod: S$GLB,,, | Performed by: AUDIOLOGIST

## 2022-09-07 NOTE — PROGRESS NOTES
2022    Cochlear Implant Programming Session:    Right Ear: Dr. Abdullahi  : Cochlear [employer]  Internal Device/Serial Number:  - [id number]  Processor: N7  [id number] and [id number]  DOS: 2020  DIS: 2020  Processor Color:  Brown  Magnet Strength N7:   1i    Coil Length:  6 cm  Battery Type:  Standard and Rechargeable   Warranty:   August 3, 2025     Left Ear - ILEANA EDWARDS  [employer]    Florin Braden was seen today for a follow-up programming session for his right N7 cochlear implant sound processor. Mr. Braden reported the volume on his right cochlear implant processor was too low. He also reported he had been having some pain and discomfort at the magnet site. Mr. Braden reported one of his batteries is blinking yellow when he connects it to the .     Impedances were measured for his right side. T and C levels were measures and adjusted for comfort. A second programt set with T and C levels at +10 steps  was created in case Mr. Braden wants to increase volume further. Mr. Braden's back-up cochlear implant sound processor was programmed with the same settings. To reduce discomfort, Mr. Braden's magent strength was changes to a 1i.     Aided testing was conducted in sound field with Mr. Braden's right cochlear implant sound processor at user settings and revealed thresholds between 25 and 30 dB HL for 250-4000 Hz. Aided speech reception threshold was obtained in soundfield at 25 dB HL with only the right cochlear implant sound processor. Speech discrimintation score with only the right cochlear implant sound processor was 60%. Speech discrimintation score with the right cochlear implant sound processor and the left ILEANA hearing aid was 80%.    [facility] line was contacted to inquire about Mr. Braden's warranty for his batteries and they confirmed that the warranty had . Mr. Braden's insurance information will be sent to Cochlear to order two new batteries through his insurance. The batteries  will be sent directly to the patient's home.     Recommend  Programming of cochlear implant sound processor and hearing aid as needed  Annual evaluation

## 2023-08-02 ENCOUNTER — CLINICAL SUPPORT (OUTPATIENT)
Dept: AUDIOLOGY | Facility: CLINIC | Age: 83
End: 2023-08-02
Payer: MEDICARE

## 2023-08-02 DIAGNOSIS — H93.293 IMPAIRMENT OF AUDITORY DISCRIMINATION OF BOTH EARS: ICD-10-CM

## 2023-08-02 DIAGNOSIS — H90.3 SENSORINEURAL HEARING LOSS, BILATERAL: Primary | ICD-10-CM

## 2023-08-02 PROCEDURE — 92604 REPROGRAM COCHLEAR IMPLT 7/>: CPT | Mod: S$GLB,,, | Performed by: AUDIOLOGIST

## 2023-08-02 PROCEDURE — 92604 PR DX ANAL COCHLEAR IMP,PT >7 YRS,REPROG: ICD-10-PCS | Mod: S$GLB,,, | Performed by: AUDIOLOGIST

## 2023-08-02 NOTE — PROGRESS NOTES
8/2/2023    Cochlear Implant Programming Session:    Right Ear: Dr. Abdullahi  : Co[employer]ar [employer]  Internal Device/Serial Number:  - [id number]  Processor: N7  [id number] and [id number]  DOS: 7/7/2020  DIS: 8/6/2020  Processor Color:  Brown  Magnet Strength N7:   2i    Coil Length:  6 cm  Battery Type:  Standard and Rechargeable   Warranty:   August 3, 2025     Left Ear - ILEANA EDWARDS  Alisha Braden was seen for a follow up programming session with his cochlear implant sound processor.  Mr. Braden wears an Audibel hearing aid in his left ear.  He felt the hearing aid was louder than the cochlear implant.  Mr. Braden was unable to hear well on the phone streaming to the left ear.  He was accompanied by his son-in-law for technical support with his phone and the MAR[employer].    Impedance testing was completed.  The processor was set with a new map based on T and C levels.  Three progressive maps were created with increased stimulation.    Mr. Braden was pleased with the sound quality of the new maps and his ability to control it with the phone.  Mr. Braden may benefit from bimodal streaming with a compatible RESOUND hearing aid.  All of his questions were answered at this time.    Recommend:  Follow up programming as needed.  Annual evaluation.

## 2024-08-19 PROBLEM — R26.89 ABNORMALITY OF GAIT DUE TO IMPAIRMENT OF BALANCE: Status: ACTIVE | Noted: 2024-08-19

## 2024-08-19 PROBLEM — H83.2X9 VESTIBULAR HYPOFUNCTION: Status: ACTIVE | Noted: 2024-08-19

## 2024-08-19 PROBLEM — Z74.09 IMPAIRED FUNCTIONAL MOBILITY, BALANCE, AND ENDURANCE: Status: ACTIVE | Noted: 2024-08-19

## 2024-09-20 PROBLEM — Z74.09 IMPAIRED FUNCTIONAL MOBILITY, BALANCE, AND ENDURANCE: Status: RESOLVED | Noted: 2024-08-19 | Resolved: 2024-09-20

## 2024-09-20 PROBLEM — H83.2X9 VESTIBULAR HYPOFUNCTION: Status: RESOLVED | Noted: 2024-08-19 | Resolved: 2024-09-20

## 2024-09-20 PROBLEM — R26.89 ABNORMALITY OF GAIT DUE TO IMPAIRMENT OF BALANCE: Status: RESOLVED | Noted: 2024-08-19 | Resolved: 2024-09-20

## 2024-11-25 ENCOUNTER — TELEPHONE (OUTPATIENT)
Dept: OTOLARYNGOLOGY | Facility: CLINIC | Age: 84
End: 2024-11-25

## 2024-12-11 ENCOUNTER — OFFICE VISIT (OUTPATIENT)
Dept: OTOLARYNGOLOGY | Facility: CLINIC | Age: 84
End: 2024-12-11

## 2024-12-11 ENCOUNTER — CLINICAL SUPPORT (OUTPATIENT)
Dept: AUDIOLOGY | Facility: CLINIC | Age: 84
End: 2024-12-11

## 2024-12-11 DIAGNOSIS — H90.3 SENSORINEURAL HEARING LOSS (SNHL) OF BOTH EARS: ICD-10-CM

## 2024-12-11 DIAGNOSIS — H81.12 BENIGN PAROXYSMAL POSITIONAL VERTIGO OF LEFT EAR: Primary | ICD-10-CM

## 2024-12-11 DIAGNOSIS — H90.72 MIXED CONDUCTIVE AND SENSORINEURAL HEARING LOSS OF LEFT EAR WITH UNRESTRICTED HEARING OF RIGHT EAR: Primary | ICD-10-CM

## 2024-12-11 DIAGNOSIS — H81.8X9 OTHER DISORDERS OF VESTIBULAR FUNCTION, UNSPECIFIED EAR: ICD-10-CM

## 2024-12-11 PROCEDURE — 92557 COMPREHENSIVE HEARING TEST: CPT | Mod: 52,PBBFAC

## 2024-12-11 PROCEDURE — 99999 PR PBB SHADOW E&M-EST. PATIENT-LVL I: CPT | Mod: PBBFAC,,,

## 2024-12-11 PROCEDURE — 99999 PR PBB SHADOW E&M-EST. PATIENT-LVL I: CPT | Mod: PBBFAC,,, | Performed by: OTOLARYNGOLOGY

## 2024-12-11 PROCEDURE — 99211 OFF/OP EST MAY X REQ PHY/QHP: CPT | Mod: PBBFAC | Performed by: OTOLARYNGOLOGY

## 2024-12-11 PROCEDURE — 99211 OFF/OP EST MAY X REQ PHY/QHP: CPT | Mod: PBBFAC,27

## 2024-12-11 PROCEDURE — 92567 TYMPANOMETRY: CPT | Mod: 52,PBBFAC

## 2024-12-11 PROCEDURE — 99205 OFFICE O/P NEW HI 60 MIN: CPT | Mod: S$PBB,,, | Performed by: OTOLARYNGOLOGY

## 2024-12-11 NOTE — PROGRESS NOTES
Subjective     Patient ID: Florin Braden is a 84 y.o. male.    Chief Complaint: Dizziness    HPI: Hx of R CI/ Doing well.    Uses aid AS/ Hx of TM ret.    Now with pos dizz.    Had PT with some help.    O ear sx's.    No BIH sx's.    Past Medical History: Patient has a past medical history of CAD (coronary artery disease), Colon polyp, Diabetes mellitus, Diabetic neuropathy, Diabetic retinopathy, Elevated cholesterol, Gastric polyp, Gastroparesis, Hypertension, and Internal hemorrhoids.    Past Surgical History: Patient has a past surgical history that includes Coronary angioplasty with stent; Cholecystectomy; Hernia repair (Bilateral); Polypectomy; Knee arthroscopy (Left); Eye surgery (Bilateral); Coronary angioplasty with stent (04/01/2006); Coronary angioplasty with stent (05/01/2006); Colonoscopy (N/A, 12/11/2018); and Implantation of cochlear prosthesis (Right, 7/7/2020).    Social History: Patient reports that he has never smoked. He has never used smokeless tobacco. He reports that he does not drink alcohol and does not use drugs.    Family History: family history includes Cancer in his father; Diabetes in his mother; Heart disease in his father; Hypertension in his mother; Stroke in his mother.    Medications:   Current Outpatient Medications   Medication Sig    aspirin (ECOTRIN) 81 MG EC tablet Take 81 mg by mouth once daily.    co-enzyme Q-10 30 mg capsule Take 30 mg by mouth 3 (three) times daily.    folic acid/multivit-min/lutein (CENTRUM SILVER ORAL) Take 1 tablet by mouth once daily.    HYDROcodone-acetaminophen (NORCO) 5-325 mg per tablet Take 1 tablet by mouth every 6 (six) hours as needed for Pain.    liraglutide 0.6 mg/0.1 mL, 18 mg/3 mL, subq PNIJ (VICTOZA 2-MARCELO) 0.6 mg/0.1 mL (18 mg/3 mL) PnIj Inject 18 mg into the skin once daily.    meclizine (ANTIVERT) 12.5 mg tablet Take 1 tablet (12.5 mg total) by mouth 3 (three) times daily as needed for Dizziness.    metFORMIN (GLUCOPHAGE) 500 MG tablet  Take 500 mg by mouth 2 (two) times daily with meals.    metoprolol succinate (TOPROL-XL) 25 MG 24 hr tablet Take 25 mg by mouth once daily.    niacin (NIASPAN) 750 mg TbSR Take 750 mg by mouth once daily.    ondansetron (ZOFRAN-ODT) 4 MG TbDL Take 1 tablet (4 mg total) by mouth every 6 (six) hours as needed (nausea).    vit A/vit C/vit E/zinc/copper (PRESERVISION AREDS ORAL) Take 1 tablet by mouth once daily.    vit B complex no.12/niacin,B3, (VITAMIN B COMPLEX NO.12-NIACIN ORAL) Take 1 tablet by mouth once daily.     No current facility-administered medications for this visit.       Allergies: Patient is allergic to pcn [penicillins].    Review of Systems   Constitutional: Negative.  Negative for activity change, appetite change, chills, diaphoresis, fatigue, fever and unexpected weight change.   HENT: Negative.  Negative for nasal congestion, ear discharge, ear pain, facial swelling, hearing loss, nosebleeds, postnasal drip, rhinorrhea, sinus pressure/congestion, sneezing, sore throat, tinnitus, trouble swallowing and voice change.    Eyes: Negative.  Negative for photophobia, pain, discharge, redness and visual disturbance.   Respiratory: Negative.  Negative for cough, chest tightness, shortness of breath and wheezing.    Cardiovascular: Negative.  Negative for chest pain and palpitations.   Gastrointestinal: Negative.  Negative for abdominal pain, constipation, diarrhea and nausea.   Endocrine: Negative.    Genitourinary: Negative.  Negative for dysuria and frequency.   Musculoskeletal: Negative.  Negative for arthralgias, back pain, gait problem, joint swelling, myalgias, neck pain and neck stiffness.   Integumentary:  Negative for color change, pallor and rash. Negative.   Allergic/Immunologic: Negative.    Neurological:  Positive for dizziness and light-headedness. Negative for tremors, seizures, syncope, facial asymmetry, speech difficulty, weakness, numbness and headaches.   Hematological: Negative.   Negative for adenopathy. Does not bruise/bleed easily.   Psychiatric/Behavioral: Negative.  Negative for agitation, confusion, decreased concentration, dysphoric mood and sleep disturbance. The patient is not nervous/anxious and is not hyperactive.           Objective     Physical Exam  Constitutional:       General: He is not in acute distress.     Appearance: Normal appearance. He is well-developed. He is not ill-appearing, toxic-appearing or diaphoretic.   HENT:      Head: Not macrocephalic and not microcephalic. No raccoon eyes, Salgado's sign, abrasion, contusion, right periorbital erythema, left periorbital erythema or laceration. Hair is normal.      Jaw: No trismus.      Right Ear: Decreased hearing noted. No laceration, drainage, swelling or tenderness. No middle ear effusion. No foreign body. No mastoid tenderness. No hemotympanum. Tympanic membrane is not injected, scarred, perforated, erythematous, retracted or bulging. Tympanic membrane has normal mobility.      Left Ear: Decreased hearing noted. No laceration, drainage, swelling or tenderness.  No middle ear effusion. No foreign body. No mastoid tenderness. No hemotympanum. Tympanic membrane is injected and retracted. Tympanic membrane is not scarred, perforated, erythematous or bulging. Tympanic membrane has decreased mobility.      Ears:        Nose: No nasal deformity, septal deviation, laceration, mucosal edema or rhinorrhea.      Right Sinus: No maxillary sinus tenderness.      Left Sinus: No maxillary sinus tenderness.      Mouth/Throat:      Mouth: Mucous membranes are not pale, not dry and not cyanotic. No lacerations or oral lesions.      Dentition: Normal dentition. No dental caries or dental abscesses.      Pharynx: Uvula midline. No oropharyngeal exudate or uvula swelling.      Tonsils: No tonsillar abscesses.   Eyes:      General: No scleral icterus.        Right eye: No foreign body or discharge.         Left eye: No foreign body or  discharge.      Extraocular Movements:      Right eye: Normal extraocular motion and no nystagmus.      Left eye: Normal extraocular motion and no nystagmus.      Conjunctiva/sclera:      Right eye: Right conjunctiva is not injected. No chemosis, exudate or hemorrhage.     Left eye: Left conjunctiva is not injected. No chemosis, exudate or hemorrhage.     Pupils: Pupils are equal.      Right eye: Pupil is round and reactive.      Left eye: Pupil is round and reactive.   Neck:      Thyroid: No thyroid mass or thyromegaly.      Vascular: No JVD.      Trachea: No tracheal tenderness or tracheal deviation.   Cardiovascular:      Rate and Rhythm: Normal rate and regular rhythm.   Pulmonary:      Effort: No tachypnea, bradypnea, accessory muscle usage or respiratory distress.      Breath sounds: Normal breath sounds. No stridor.   Abdominal:      Palpations: Abdomen is soft.   Musculoskeletal:         General: Normal range of motion.      Cervical back: No edema, erythema or rigidity. No muscular tenderness. Normal range of motion.   Lymphadenopathy:      Head:      Right side of head: No submental, submandibular, tonsillar, preauricular, posterior auricular or occipital adenopathy.      Left side of head: No submental, submandibular, tonsillar, preauricular, posterior auricular or occipital adenopathy.      Cervical: No cervical adenopathy.      Right cervical: No superficial, deep or posterior cervical adenopathy.     Left cervical: No superficial, deep or posterior cervical adenopathy.   Skin:     Coloration: Skin is not pale.      Findings: No abrasion, bruising, burn, ecchymosis, erythema, laceration, lesion or rash.   Neurological:      Mental Status: He is alert and oriented to person, place, and time. He is not disoriented.      Cranial Nerves: No cranial nerve deficit.      Sensory: No sensory deficit.      Motor: No tremor, atrophy, abnormal muscle tone or seizure activity.   Psychiatric:         Mood and  Affect: Mood is not anxious or depressed. Affect is not labile, blunt, angry or inappropriate.         Speech: He is communicative. Speech is not rapid and pressured, delayed, slurred or tangential.         Behavior: Behavior normal. Behavior is not agitated, aggressive, withdrawn, hyperactive or combative.         Thought Content: Thought content normal.         Cognition and Memory: Memory is not impaired. He does not exhibit impaired recent memory or impaired remote memory.     +++ rot fatig nyst with DHP       Assessment and Plan     1. Benign paroxysmal positional vertigo of left ear    2. Sensorineural hearing loss (SNHL) of both ears        CRP to L and Bid         No follow-ups on file.

## 2024-12-11 NOTE — PROGRESS NOTES
"Florin Braden was seen today in the clinic for an audiologic evaluation.  Patient's main complaint was  dizziness . Mr. Braden reported his symptoms initially presented in Spring of 2024. He reported he was in vestibular rehabilitation for approximately 5-6 months, but that only his "heavy dizziness" has resolved. Mr. Braden described "heavy dizziness" as a room spinning, vertiginous sensation and described "light dizziness" as more of an imbalance with a slight spinning nature. He reported continued issues with "light dizziness" persisting over the last two months. Mr. Braden is currently implanted with a cochlear implant on the right side and wears a ReSound BTE in the left ear. He reported he communicating well with his devices and their current settings. Mr. Braden denied tinnitus, otalgia, and aural fullness. Due to the right cochlear implant, only the left ear was tested at this time. His last audiogram on 11/20/2019 revealed a severe to profound hearing loss,  type unspecified,  bone conduction responses obtained between 45-50 dBHL, bilaterally.    Tympanometry revealed Type A in the left ear.     Audiogram results revealed a severe to profound mixed hearing loss in the left ear.      A speech reception threshold was noted at 70 dBHL  in the left ear.    A speech discrimination score was 64% in the left ear.    Recommendations:  Otologic evaluation  Daily and consistent use of bimodal cochlear implant/hearing aid  CI/HA follow-up as needed  Annual audiogram or sooner if change perceived  Hearing protection in noise          "

## (undated) DEVICE — COVERS PROBE NR-48 STERILE

## (undated) DEVICE — BURR ROUND DIAMOND 1.5X48MM

## (undated) DEVICE — BANDAGE KERLIX P/P 2.25IN STER

## (undated) DEVICE — SPONGE GAUZE 16PLY 4X4

## (undated) DEVICE — BURR DIAMOND FINE 1.0X48

## (undated) DEVICE — SYR SLIP TIP 1CC

## (undated) DEVICE — FORCEP CURVED DISP

## (undated) DEVICE — ELECTRODE NDL

## (undated) DEVICE — KIT SUCTION IRRIGATION

## (undated) DEVICE — SEE MEDLINE ITEM 152622

## (undated) DEVICE — Device

## (undated) DEVICE — SEE MEDLINE ITEM 157128

## (undated) DEVICE — BURR CUTT CARB 3X38 E9000 FLUT

## (undated) DEVICE — DRESSING TELFA STRL 4X3 LF

## (undated) DEVICE — SUT 3/0 18IN COATED VICRYLP

## (undated) DEVICE — ELECTRODE REM PLYHSV RETURN 9

## (undated) DEVICE — GAUZE FLUFF XXLG 36X36 2 PLY

## (undated) DEVICE — SUT BONE WAX 2.5 GRMS 12/BX

## (undated) DEVICE — SUCTION SURGICAL FRAZR

## (undated) DEVICE — CLOSURE SKIN STERI STRIP 1/2X4

## (undated) DEVICE — KIT EAR EAOFE

## (undated) DEVICE — SOL IRR WATER STRL 3000 ML

## (undated) DEVICE — CLIPPER BLADE MOD 4406 (CAREF)

## (undated) DEVICE — CORD BIPOLAR 12 FOOT

## (undated) DEVICE — SOL IRR NACL .9% 3000ML

## (undated) DEVICE — BURR LSO ROUND FLUTED 5MM

## (undated) DEVICE — BIT DRILL TUBING